# Patient Record
Sex: MALE | Race: WHITE | NOT HISPANIC OR LATINO | URBAN - METROPOLITAN AREA
[De-identification: names, ages, dates, MRNs, and addresses within clinical notes are randomized per-mention and may not be internally consistent; named-entity substitution may affect disease eponyms.]

---

## 2021-07-16 ENCOUNTER — INPATIENT (INPATIENT)
Facility: HOSPITAL | Age: 46
LOS: 3 days | Discharge: HOME | End: 2021-07-20
Attending: HOSPITALIST | Admitting: HOSPITALIST
Payer: COMMERCIAL

## 2021-07-16 VITALS
WEIGHT: 190.04 LBS | DIASTOLIC BLOOD PRESSURE: 91 MMHG | SYSTOLIC BLOOD PRESSURE: 136 MMHG | HEART RATE: 62 BPM | TEMPERATURE: 99 F | RESPIRATION RATE: 17 BRPM | OXYGEN SATURATION: 100 %

## 2021-07-16 LAB
ALBUMIN SERPL ELPH-MCNC: 4.1 G/DL — SIGNIFICANT CHANGE UP (ref 3.5–5.2)
ALP SERPL-CCNC: 91 U/L — SIGNIFICANT CHANGE UP (ref 30–115)
ALT FLD-CCNC: 475 U/L — HIGH (ref 0–41)
ANION GAP SERPL CALC-SCNC: 13 MMOL/L — SIGNIFICANT CHANGE UP (ref 7–14)
AST SERPL-CCNC: 98 U/L — HIGH (ref 0–41)
BASOPHILS # BLD AUTO: 0.04 K/UL — SIGNIFICANT CHANGE UP (ref 0–0.2)
BASOPHILS NFR BLD AUTO: 0.4 % — SIGNIFICANT CHANGE UP (ref 0–1)
BILIRUB SERPL-MCNC: 0.4 MG/DL — SIGNIFICANT CHANGE UP (ref 0.2–1.2)
BUN SERPL-MCNC: 84 MG/DL — CRITICAL HIGH (ref 10–20)
CALCIUM SERPL-MCNC: 9.5 MG/DL — SIGNIFICANT CHANGE UP (ref 8.5–10.1)
CHLORIDE SERPL-SCNC: 95 MMOL/L — LOW (ref 98–110)
CK SERPL-CCNC: 6923 U/L — HIGH (ref 0–225)
CO2 SERPL-SCNC: 27 MMOL/L — SIGNIFICANT CHANGE UP (ref 17–32)
CREAT SERPL-MCNC: 5.6 MG/DL — CRITICAL HIGH (ref 0.7–1.5)
EOSINOPHIL # BLD AUTO: 0.46 K/UL — SIGNIFICANT CHANGE UP (ref 0–0.7)
EOSINOPHIL NFR BLD AUTO: 4.5 % — SIGNIFICANT CHANGE UP (ref 0–8)
GLUCOSE SERPL-MCNC: 122 MG/DL — HIGH (ref 70–99)
HCT VFR BLD CALC: 44.7 % — SIGNIFICANT CHANGE UP (ref 42–52)
HGB BLD-MCNC: 14.8 G/DL — SIGNIFICANT CHANGE UP (ref 14–18)
IMM GRANULOCYTES NFR BLD AUTO: 1.1 % — HIGH (ref 0.1–0.3)
LYMPHOCYTES # BLD AUTO: 1.42 K/UL — SIGNIFICANT CHANGE UP (ref 1.2–3.4)
LYMPHOCYTES # BLD AUTO: 14 % — LOW (ref 20.5–51.1)
MCHC RBC-ENTMCNC: 32.3 PG — HIGH (ref 27–31)
MCHC RBC-ENTMCNC: 33.1 G/DL — SIGNIFICANT CHANGE UP (ref 32–37)
MCV RBC AUTO: 97.6 FL — HIGH (ref 80–94)
MONOCYTES # BLD AUTO: 1.06 K/UL — HIGH (ref 0.1–0.6)
MONOCYTES NFR BLD AUTO: 10.5 % — HIGH (ref 1.7–9.3)
NEUTROPHILS # BLD AUTO: 7.03 K/UL — HIGH (ref 1.4–6.5)
NEUTROPHILS NFR BLD AUTO: 69.5 % — SIGNIFICANT CHANGE UP (ref 42.2–75.2)
NRBC # BLD: 0 /100 WBCS — SIGNIFICANT CHANGE UP (ref 0–0)
PLATELET # BLD AUTO: 213 K/UL — SIGNIFICANT CHANGE UP (ref 130–400)
POTASSIUM SERPL-MCNC: 5.6 MMOL/L — HIGH (ref 3.5–5)
POTASSIUM SERPL-SCNC: 5.6 MMOL/L — HIGH (ref 3.5–5)
PROT SERPL-MCNC: 7 G/DL — SIGNIFICANT CHANGE UP (ref 6–8)
RBC # BLD: 4.58 M/UL — LOW (ref 4.7–6.1)
RBC # FLD: 12.1 % — SIGNIFICANT CHANGE UP (ref 11.5–14.5)
SODIUM SERPL-SCNC: 135 MMOL/L — SIGNIFICANT CHANGE UP (ref 135–146)
WBC # BLD: 10.12 K/UL — SIGNIFICANT CHANGE UP (ref 4.8–10.8)
WBC # FLD AUTO: 10.12 K/UL — SIGNIFICANT CHANGE UP (ref 4.8–10.8)

## 2021-07-16 PROCEDURE — 73090 X-RAY EXAM OF FOREARM: CPT | Mod: 26,RT

## 2021-07-16 PROCEDURE — 71046 X-RAY EXAM CHEST 2 VIEWS: CPT | Mod: 26

## 2021-07-16 PROCEDURE — 70450 CT HEAD/BRAIN W/O DYE: CPT | Mod: 26,MA

## 2021-07-16 PROCEDURE — 73080 X-RAY EXAM OF ELBOW: CPT | Mod: 26,RT

## 2021-07-16 PROCEDURE — 73110 X-RAY EXAM OF WRIST: CPT | Mod: 26,RT

## 2021-07-16 PROCEDURE — 99285 EMERGENCY DEPT VISIT HI MDM: CPT

## 2021-07-16 RX ORDER — VANCOMYCIN HCL 1 G
1000 VIAL (EA) INTRAVENOUS ONCE
Refills: 0 | Status: COMPLETED | OUTPATIENT
Start: 2021-07-16 | End: 2021-07-16

## 2021-07-16 RX ORDER — SODIUM CHLORIDE 9 MG/ML
2000 INJECTION, SOLUTION INTRAVENOUS ONCE
Refills: 0 | Status: COMPLETED | OUTPATIENT
Start: 2021-07-16 | End: 2021-07-16

## 2021-07-16 RX ADMIN — SODIUM CHLORIDE 2000 MILLILITER(S): 9 INJECTION, SOLUTION INTRAVENOUS at 23:31

## 2021-07-16 RX ADMIN — Medication 250 MILLIGRAM(S): at 23:31

## 2021-07-16 NOTE — ED PROVIDER NOTE - OBJECTIVE STATEMENT
Pt with no PMH presents 2 days after a fall while at . pt states there was a black out in the hotel and he tripped over a chair in his room. Pt thinks he blacked out from the fall and woke with pain to right arm, right ribs and right thigh. Over the next 2 days the pain worsened and he developed redness to the arm, thigh and now also to right upper arm. Denies fever chills. Admits to HA and NV today. Denies drug use. Was not intoxicated at time of fall. Length of LOC was unknown

## 2021-07-16 NOTE — ED ADULT TRIAGE NOTE - CHIEF COMPLAINT QUOTE
Pt was in [place] when he tripped and fell on his right side, No LOC. Pt c/o right arm, thigh bruising and right ankle pain. Redness noted on pt's RUQ and right lower thigh.

## 2021-07-16 NOTE — ED PROVIDER NOTE - PHYSICAL EXAMINATION
CONST: Well appearing in NAD  EYES: PERRL, EOMI, Sclera and conjunctiva clear.   ENT: Oropharynx normal appearing, no erythema or exudates. Uvula midline.  NECK: Non-tender, no meningeal signs  CARD: Normal S1 S2; Normal rate and rhythm  RESP: Equal BS B/L, No wheezes, rhonchi or rales. No distress  GI: Soft, non-tender, non-distended.  MS: RUE: there is tenderness and swelling from the elbow down to the hand with erythema to volar surface mid to proximal forearm. No streaking. No crepitous. NV intact distally. Pain on ROM of wrist and fingers. RLE with blistered red area to anterior thigh with localized swelling. No ecchymosis or streaking or crepitous of skin. FROM of joints intact. NV intact distally. LUE: lupe is small red macular papular rash to distal anterior bicep region with well defined edges. No streaking, drainage or swelling. FROM of joints and NV intact.  Ribs: there is red weeping blistered/abrated rash well defined border   to right anterior lower rib.  No midline spinal tenderness.    NEURO: A&Ox3, No focal deficits. Strength 5/5 with no sensory deficits. Steady gait

## 2021-07-16 NOTE — ED PROVIDER NOTE - CARE PLAN
Principal Discharge DX:	Cellulitis of forearm, right   Principal Discharge DX:	Acute renal failure  Secondary Diagnosis:	Cellulitis of forearm, right  Secondary Diagnosis:	Rhabdomyolysis

## 2021-07-16 NOTE — ED PROVIDER NOTE - CLINICAL SUMMARY MEDICAL DECISION MAKING FREE TEXT BOX
fall, cellulitis. labs, imaging, duplex neg, vanco given. admit to hospital. fall, cellulitis. labs, imaging, duplex neg, vanco given. found to be in acute renal failure. making urine. Renal US ordered. VBG ordered.  admit to hospital.

## 2021-07-16 NOTE — ED PROVIDER NOTE - ATTENDING CONTRIBUTION TO CARE
pt co RUE, RLE pain. rashes, chills. for 2 days. occurred after a fall at [place]. sts lights went out and he tripped on a chair. hit his head and +loc. no neck pain. since then his right forearm has been hurting and getting swollen. he denies fever but sts getting shaking chills.  he sts he has a red area that had all blisters on his right thigh which was leaking pus and he popped it.  denies IVDU or substance abuse. no antony.     pt in nad, ncat, perrl, eomi, neck sup, ctab, rrr, ab soft, nt, nd.    RUE +forearm edema down to fingers. nml bounding pulses. nml cap refill. dec ROM 2/2 edema and pain.  erythema to prox ventral forearm, indurated no fluctance. no streaking. no lad. no open skin. +warmth. no crepitus.  RLE has a 7x6cm patch on antior thigh of well circumscribed erthema  warmth, tender, broken bullae. no pus noted. no streaking.   Alert and oriented, face symmetric and speech fluent. Strength 5/5 x 4 ext and symmetric, nml gross motor movement, nml RRM/BONNIE/FTN, nml gait. No focal deficits noted.    will get labs, imaging, supportive care

## 2021-07-16 NOTE — ED PROVIDER NOTE - NS ED ROS FT
CONST: No fever, chills or bodyaches  EYES: No pain, redness, drainage or visual changes.  ENT: No ear pain or discharge, nasal discharge or congestion. No sore throat  CARD: No chest pain, palpitations  RESP: No SOB, cough, hemoptysis.   GI: No abdominal pain, (+)N/V  MS: (+) right arm and right thigh and right rib pain  SKIN: (+)red patches on extremities  NEURO: (+) headache, No dizziness, paresthesias   : no hematuria

## 2021-07-17 LAB
ALBUMIN SERPL ELPH-MCNC: 3.4 G/DL — LOW (ref 3.5–5.2)
ALP SERPL-CCNC: 68 U/L — SIGNIFICANT CHANGE UP (ref 30–115)
ALT FLD-CCNC: 331 U/L — HIGH (ref 0–41)
AMPHET UR-MCNC: NEGATIVE — SIGNIFICANT CHANGE UP
ANION GAP SERPL CALC-SCNC: 8 MMOL/L — SIGNIFICANT CHANGE UP (ref 7–14)
APPEARANCE UR: CLEAR — SIGNIFICANT CHANGE UP
AST SERPL-CCNC: 56 U/L — HIGH (ref 0–41)
BACTERIA # UR AUTO: NEGATIVE — SIGNIFICANT CHANGE UP
BARBITURATES UR SCN-MCNC: NEGATIVE — SIGNIFICANT CHANGE UP
BASE EXCESS BLDV CALC-SCNC: 4.3 MMOL/L — HIGH (ref -2–2)
BASOPHILS # BLD AUTO: 0.03 K/UL — SIGNIFICANT CHANGE UP (ref 0–0.2)
BASOPHILS NFR BLD AUTO: 0.4 % — SIGNIFICANT CHANGE UP (ref 0–1)
BENZODIAZ UR-MCNC: POSITIVE
BILIRUB DIRECT SERPL-MCNC: <0.2 MG/DL — SIGNIFICANT CHANGE UP (ref 0–0.2)
BILIRUB INDIRECT FLD-MCNC: >0.1 MG/DL — LOW (ref 0.2–1.2)
BILIRUB SERPL-MCNC: 0.3 MG/DL — SIGNIFICANT CHANGE UP (ref 0.2–1.2)
BILIRUB UR-MCNC: NEGATIVE — SIGNIFICANT CHANGE UP
BUN SERPL-MCNC: 73 MG/DL — CRITICAL HIGH (ref 10–20)
CA-I SERPL-SCNC: 1.2 MMOL/L — SIGNIFICANT CHANGE UP (ref 1.12–1.3)
CALCIUM SERPL-MCNC: 8.6 MG/DL — SIGNIFICANT CHANGE UP (ref 8.5–10.1)
CHLORIDE SERPL-SCNC: 101 MMOL/L — SIGNIFICANT CHANGE UP (ref 98–110)
CK SERPL-CCNC: 3634 U/L — HIGH (ref 0–225)
CO2 SERPL-SCNC: 27 MMOL/L — SIGNIFICANT CHANGE UP (ref 17–32)
COCAINE METAB.OTHER UR-MCNC: POSITIVE
COLOR SPEC: COLORLESS — SIGNIFICANT CHANGE UP
CREAT SERPL-MCNC: 4.5 MG/DL — CRITICAL HIGH (ref 0.7–1.5)
DIFF PNL FLD: ABNORMAL
EOSINOPHIL # BLD AUTO: 0.44 K/UL — SIGNIFICANT CHANGE UP (ref 0–0.7)
EOSINOPHIL NFR BLD AUTO: 5.6 % — SIGNIFICANT CHANGE UP (ref 0–8)
EPI CELLS # UR: 2 /HPF — SIGNIFICANT CHANGE UP (ref 0–5)
GAS PNL BLDV: 134 MMOL/L — LOW (ref 136–145)
GAS PNL BLDV: SIGNIFICANT CHANGE UP
GLUCOSE SERPL-MCNC: 77 MG/DL — SIGNIFICANT CHANGE UP (ref 70–99)
GLUCOSE UR QL: NEGATIVE — SIGNIFICANT CHANGE UP
HCO3 BLDV-SCNC: 31 MMOL/L — HIGH (ref 22–29)
HCT VFR BLD CALC: 36.8 % — LOW (ref 42–52)
HCT VFR BLDA CALC: 40.5 % — SIGNIFICANT CHANGE UP (ref 34–44)
HGB BLD CALC-MCNC: 13.2 G/DL — LOW (ref 14–18)
HGB BLD-MCNC: 11.8 G/DL — LOW (ref 14–18)
IMM GRANULOCYTES NFR BLD AUTO: 1 % — HIGH (ref 0.1–0.3)
KETONES UR-MCNC: NEGATIVE — SIGNIFICANT CHANGE UP
LEUKOCYTE ESTERASE UR-ACNC: NEGATIVE — SIGNIFICANT CHANGE UP
LYMPHOCYTES # BLD AUTO: 1.51 K/UL — SIGNIFICANT CHANGE UP (ref 1.2–3.4)
LYMPHOCYTES # BLD AUTO: 19.1 % — LOW (ref 20.5–51.1)
MAGNESIUM SERPL-MCNC: 2.1 MG/DL — SIGNIFICANT CHANGE UP (ref 1.8–2.4)
MCHC RBC-ENTMCNC: 31.7 PG — HIGH (ref 27–31)
MCHC RBC-ENTMCNC: 32.1 G/DL — SIGNIFICANT CHANGE UP (ref 32–37)
MCV RBC AUTO: 98.9 FL — HIGH (ref 80–94)
METHADONE UR-MCNC: NEGATIVE — SIGNIFICANT CHANGE UP
MONOCYTES # BLD AUTO: 1.03 K/UL — HIGH (ref 0.1–0.6)
MONOCYTES NFR BLD AUTO: 13.1 % — HIGH (ref 1.7–9.3)
NEUTROPHILS # BLD AUTO: 4.8 K/UL — SIGNIFICANT CHANGE UP (ref 1.4–6.5)
NEUTROPHILS NFR BLD AUTO: 60.8 % — SIGNIFICANT CHANGE UP (ref 42.2–75.2)
NITRITE UR-MCNC: NEGATIVE — SIGNIFICANT CHANGE UP
NRBC # BLD: 0 /100 WBCS — SIGNIFICANT CHANGE UP (ref 0–0)
OPIATES UR-MCNC: POSITIVE
PCO2 BLDV: 57 MMHG — HIGH (ref 41–51)
PCP SPEC-MCNC: SIGNIFICANT CHANGE UP
PH BLDV: 7.35 — SIGNIFICANT CHANGE UP (ref 7.26–7.43)
PH UR: 6.5 — SIGNIFICANT CHANGE UP (ref 5–8)
PLATELET # BLD AUTO: 161 K/UL — SIGNIFICANT CHANGE UP (ref 130–400)
PO2 BLDV: 37 MMHG — SIGNIFICANT CHANGE UP (ref 20–40)
POTASSIUM BLDV-SCNC: 4.9 MMOL/L — SIGNIFICANT CHANGE UP (ref 3.3–5.6)
POTASSIUM SERPL-MCNC: 5 MMOL/L — SIGNIFICANT CHANGE UP (ref 3.5–5)
POTASSIUM SERPL-SCNC: 5 MMOL/L — SIGNIFICANT CHANGE UP (ref 3.5–5)
PROPOXYPHENE QUALITATIVE URINE RESULT: NEGATIVE — SIGNIFICANT CHANGE UP
PROT SERPL-MCNC: 5.5 G/DL — LOW (ref 6–8)
PROT UR-MCNC: SIGNIFICANT CHANGE UP
RBC # BLD: 3.72 M/UL — LOW (ref 4.7–6.1)
RBC # FLD: 12 % — SIGNIFICANT CHANGE UP (ref 11.5–14.5)
RBC CASTS # UR COMP ASSIST: 5 /HPF — HIGH (ref 0–4)
SAO2 % BLDV: 71 % — SIGNIFICANT CHANGE UP
SARS-COV-2 RNA SPEC QL NAA+PROBE: SIGNIFICANT CHANGE UP
SODIUM SERPL-SCNC: 136 MMOL/L — SIGNIFICANT CHANGE UP (ref 135–146)
SP GR SPEC: 1.01 — LOW (ref 1.01–1.03)
UROBILINOGEN FLD QL: SIGNIFICANT CHANGE UP
WBC # BLD: 7.89 K/UL — SIGNIFICANT CHANGE UP (ref 4.8–10.8)
WBC # FLD AUTO: 7.89 K/UL — SIGNIFICANT CHANGE UP (ref 4.8–10.8)
WBC UR QL: 0 /HPF — SIGNIFICANT CHANGE UP (ref 0–5)

## 2021-07-17 PROCEDURE — 99223 1ST HOSP IP/OBS HIGH 75: CPT

## 2021-07-17 PROCEDURE — 93010 ELECTROCARDIOGRAM REPORT: CPT

## 2021-07-17 PROCEDURE — 76770 US EXAM ABDO BACK WALL COMP: CPT | Mod: 26

## 2021-07-17 PROCEDURE — 99222 1ST HOSP IP/OBS MODERATE 55: CPT

## 2021-07-17 RX ORDER — MORPHINE SULFATE 50 MG/1
4 CAPSULE, EXTENDED RELEASE ORAL ONCE
Refills: 0 | Status: DISCONTINUED | OUTPATIENT
Start: 2021-07-17 | End: 2021-07-17

## 2021-07-17 RX ORDER — MORPHINE SULFATE 50 MG/1
2 CAPSULE, EXTENDED RELEASE ORAL ONCE
Refills: 0 | Status: DISCONTINUED | OUTPATIENT
Start: 2021-07-17 | End: 2021-07-17

## 2021-07-17 RX ORDER — ACETAMINOPHEN 500 MG
650 TABLET ORAL EVERY 6 HOURS
Refills: 0 | Status: DISCONTINUED | OUTPATIENT
Start: 2021-07-17 | End: 2021-07-17

## 2021-07-17 RX ORDER — ONDANSETRON 8 MG/1
4 TABLET, FILM COATED ORAL EVERY 8 HOURS
Refills: 0 | Status: DISCONTINUED | OUTPATIENT
Start: 2021-07-17 | End: 2021-07-20

## 2021-07-17 RX ORDER — LANOLIN ALCOHOL/MO/W.PET/CERES
3 CREAM (GRAM) TOPICAL AT BEDTIME
Refills: 0 | Status: DISCONTINUED | OUTPATIENT
Start: 2021-07-17 | End: 2021-07-20

## 2021-07-17 RX ORDER — CEFAZOLIN SODIUM 1 G
1000 VIAL (EA) INJECTION EVERY 8 HOURS
Refills: 0 | Status: DISCONTINUED | OUTPATIENT
Start: 2021-07-17 | End: 2021-07-17

## 2021-07-17 RX ORDER — SODIUM CHLORIDE 9 MG/ML
1000 INJECTION INTRAMUSCULAR; INTRAVENOUS; SUBCUTANEOUS
Refills: 0 | Status: DISCONTINUED | OUTPATIENT
Start: 2021-07-17 | End: 2021-07-18

## 2021-07-17 RX ORDER — CEFAZOLIN SODIUM 1 G
1000 VIAL (EA) INJECTION EVERY 12 HOURS
Refills: 0 | Status: DISCONTINUED | OUTPATIENT
Start: 2021-07-17 | End: 2021-07-17

## 2021-07-17 RX ORDER — HEPARIN SODIUM 5000 [USP'U]/ML
5000 INJECTION INTRAVENOUS; SUBCUTANEOUS EVERY 12 HOURS
Refills: 0 | Status: DISCONTINUED | OUTPATIENT
Start: 2021-07-17 | End: 2021-07-20

## 2021-07-17 RX ADMIN — HEPARIN SODIUM 5000 UNIT(S): 5000 INJECTION INTRAVENOUS; SUBCUTANEOUS at 05:00

## 2021-07-17 RX ADMIN — MORPHINE SULFATE 2 MILLIGRAM(S): 50 CAPSULE, EXTENDED RELEASE ORAL at 12:09

## 2021-07-17 RX ADMIN — MORPHINE SULFATE 2 MILLIGRAM(S): 50 CAPSULE, EXTENDED RELEASE ORAL at 17:30

## 2021-07-17 RX ADMIN — Medication 100 MILLIGRAM(S): at 13:54

## 2021-07-17 RX ADMIN — HEPARIN SODIUM 5000 UNIT(S): 5000 INJECTION INTRAVENOUS; SUBCUTANEOUS at 17:30

## 2021-07-17 RX ADMIN — SODIUM CHLORIDE 200 MILLILITER(S): 9 INJECTION INTRAMUSCULAR; INTRAVENOUS; SUBCUTANEOUS at 04:50

## 2021-07-17 RX ADMIN — MORPHINE SULFATE 2 MILLIGRAM(S): 50 CAPSULE, EXTENDED RELEASE ORAL at 10:42

## 2021-07-17 RX ADMIN — MORPHINE SULFATE 4 MILLIGRAM(S): 50 CAPSULE, EXTENDED RELEASE ORAL at 04:49

## 2021-07-17 RX ADMIN — Medication 100 MILLIGRAM(S): at 05:00

## 2021-07-17 RX ADMIN — Medication 100 MILLIGRAM(S): at 22:03

## 2021-07-17 RX ADMIN — Medication 100 MILLIGRAM(S): at 09:40

## 2021-07-17 NOTE — H&P ADULT - ATTENDING COMMENTS
Rhabdomyolysis s/p mechanical fall. IVFs (1/2 NS). Monitor urinary out-put (Maintain 1-1.5 cc/kg/hr). If BELEN and oliguria develop, alkalinize the urine with bicarb infusion or addition of bicarb to the IVFs. Trend CK/LFTs levels. Serial BMPs.    Seizure w/ post-ictal state, now resolved. Neurology is following. Keppra load. Ativan PRN. AED levels. EEG. MRI. Restart home meds. Seizure precautions.     Cellulitis of LE; No sepsis on admission. Send ESR/CRP. A1c and lipids. FU blood cultures. IV ABXs (Unasyn). FU official XR results. ID consult. Obtain venous/arterial doppler. Vascular consult if positive.       Incomplete Note 44 YO M with no known PMH who presents to the hospital with a c/o RUE pain/swelling s/p trip and fall in bathroom x 2 days ago. + HT, + LOC (~ 1 hour). Found on the ground w/ vomitus around him by his brother-in-law. Denies any incontinence or tongue biting. Denies any drug or EtOH use. ROS negative except as above.     Of note, pt has been taking Naproxen that was given to him by a friend ever since his fall.     In the ED, XRs/Doppler of RUE are negative (pending official read). Chest X-Ray with no acute process (pending official read). CTH is negative for acute process. Pt with elevated Ck and BELEN, started on IVFs (LR) and IV ABXs (Vanco) in the ED.     FMHx: Reviewed, not relevant    Physical exam shows pt in NAD. VSS, afebrile, not hypoxic on RA. A&Ox3. Head is atraumatic. Non-focal neuro exam. Muscle strength intact. Numbness of fingers of the right hand. CTA B/L with no W/C/R. RRR, no M/G/R. ABD is soft and non-tender, normoactive BSs. Right forearm is tense, paresthesia of fingertips, abrasion noted, pulses palpable, motor strength intact. Right thigh with large abrasion on lateral aspect with skin tear. Left forearm with abrasion. Labs and radiology as above.     Rhabdomyolysis s/p mechanical fall. IVFs (1/2 NS). Monitor urinary out-put (Maintain 1-1.5 cc/kg/hr). If BELEN and oliguria develop, alkalinize the urine with bicarb infusion or addition of bicarb to the IVFs. Trend CK/LFTs levels. Serial BMPs.    Transaminitis, from above. IVFs. Repeat LFTs in the AM.     BELEN, likely pre-renal + NSAID-induced nephropathy. Send UA, urine lytes, urine pr:cr ratio, and trend BMP. IVFs. Monitor urinary out-put. Hold nephrotoxic agents.     Fall with head trauma and possible seizure. Neuro eval. Ativan PRN. EEG. Restart home meds. Seizure precautions.     Cellulitis of right thigh; No sepsis on admission. A1c and lipids. FU blood cultures. IV ABXs (Cefezolin). FU official XR results. FU official doppler results.     Restart home meds, except as stated above. DVT PPX. Inform PCP of pt's admission to hospital. My note supersedes the residents note.

## 2021-07-17 NOTE — ED ADULT NURSE NOTE - OBJECTIVE STATEMENT
Pt with no PMH presents 2 days after a fall while at . pt states there was a black out in the hotel and he tripped over a chair in his room. Pt thinks he blacked out from the fall and woke with pain to right arm, right ribs and right thigh. Over the next 2 days the pain worsened and he developed redness to the arm, thigh and now also to right upper arm. Denies fever chills. Admits to HA and NV today

## 2021-07-17 NOTE — CONSULT NOTE ADULT - ASSESSMENT
Impression:  Pt with no PMH presents 2 days after a fall while at [place]. Patient states there was a black out in the hotel and he tripped over a chair in his room. Pt thinks he blacked out from the fall and woke with pain to right arm, right ribs and right thigh. Over the next 2 days the pain worsened and he developed redness to the arm, thigh and now also to right upper arm. He denies fever but states he's been getting shaking chills. Labs remarkable for Cr 5.6 CK 6.9K ALT//98. CT head negative for acute bleed. Ptn admitted to medicine service for rhabdomyolysis secondary to presumed diffuse tissue injury s/p traumatic fall. On exam limited range of motion RUE RLE and hypersensitivity.     Suggestion:  Rhabdomyolisis management by medicine.   RUE swelling management by medicine.   Routine EEG  Keep magnesium >2  Seizure precautions  Trend CK and LFTs    Will Napier NP   Impression:  Pt with no PMH presents 2 days after a fall while at [place]. Patient states there was a black out in the hotel and he tripped over a chair in his room. Pt thinks he blacked out from the fall and woke with pain to right arm, right ribs and right thigh. Over the next 2 days the pain worsened and he developed redness to the arm, thigh and now also to right upper arm. He denies fever but states he's been getting shaking chills. Labs remarkable for Cr 5.6 CK 6.9K ALT//98. CT head negative for acute bleed. Ptn admitted to medicine service for rhabdomyolysis secondary to presumed diffuse tissue injury s/p traumatic fall. On exam limited range of motion RUE RLE and hypersensitivity.     Suggestion:  Rhabdomyolisis management by medicine.   RUE swelling management by medicine/surgery  Routine EEG > VEEG  Keep magnesium >2  Seizure precautions  Trend CK and LFTs  no AEDs for now    Will Napier NP

## 2021-07-17 NOTE — CONSULT NOTE ADULT - ATTENDING COMMENTS
I have personally seen and examined this patient.  I have fully participated in the care of this patient.  I have reviewed all pertinent clinical information, including history, physical exam, plan and note.   I have reviewed all pertinent clinical information and reviewed all relevant imaging and diagnostic studies personally.  Pt w/ unwitnessed LOC w/ prolonged LOC r/o seizure.  Recommend f/u REEG and proceed with VEEG x 24 hrs.  No AEDs for now.  Recommendations as above.  Agree with above assessment except as noted.

## 2021-07-17 NOTE — CONSULT NOTE ADULT - SUBJECTIVE AND OBJECTIVE BOX
GENERAL SURGERY CONSULT NOTE    Patient: JOCY KENNEDY , 45y ([birth year])Male   MRN: [id number]  Location: [facility] T4-3B 022 B  Visit: 21 Inpatient  Date: 21 @ 06:18    HPI:  Pt with no PMH presents 2 days after a fall while at [place]. Patient states there was a black out in the hotel and he tripped over a chair in his room. Pt thinks he blacked out from the fall and woke with pain to right arm, right ribs and right thigh. Over the next 2 days the pain worsened and he developed redness to the arm, thigh and now also to right upper arm. He denies fever but states he's been getting shaking chills. He states he has a red area that had all blisters on his right thigh which was leaking pus and he popped it.  Denies IVDU or substance abuse.chills. Admits to HA and NV today. Denies drug use. Was not intoxicated at time of fall. Length of LOC was unknown. Denies diarrhea/constipation / recent sick contacts / weakness    In the ED  BP  136/91  HR 62  RR 17  T 98.7  SpO2 100% on room air. Labs remarkable for Cr 5.6 CK 6.9K ALT//98. CT head negative for acute bleed. Ptn admitted to medicine service for rhabdomyolysis secondary to presumed diffuse tissue injury s/p traumatic fall under questionable circumstances   (2021 01:27)    VITALS:  T(F): 97.9 (21 @ 05:10), Max: 98.7 (21 @ 20:12)  HR: 62 (21 @ 05:10) (57 - 62)  BP: 131/77 (21 @ 05:10) (131/77 - 142/85)  RR: 16 (21 @ 05:10) (16 - 18)  SpO2: 99% (21 @ 05:10) (99% - 100%)    PHYSICAL EXAM:  General: NAD, AAOx3, calm and cooperative  HEENT:  EOMI, Trachea ML, Neck supple  Cardiac: RRR S1, S2, no Murmurs, rubs or gallops  Respiratory: CTAB, normal respiratory effort, breath sounds equal BL, no wheeze, rhonchi or crackles  Abdomen: Soft, non-distended, non-tender  Musculoskeletal: Reduced strength and range of motion in right arm with obvious arm swelling.   Neuro: Sensation grossly intact and equal throughout, no focal deficits  Vascular: Ultrasound was used and found good radial/ulnar/palmar blood flow  Skin: Abrasion on right forearm, right thigh, and left extremity    MEDICATIONS  (STANDING):  ceFAZolin   IVPB 1000 milliGRAM(s) IV Intermittent every 12 hours  heparin   Injectable 5000 Unit(s) SubCutaneous every 12 hours  sodium chloride 0.9%. 1000 milliLiter(s) (200 mL/Hr) IV Continuous <Continuous>    MEDICATIONS  (PRN):  aluminum hydroxide/magnesium hydroxide/simethicone Suspension 30 milliLiter(s) Oral every 4 hours PRN Dyspepsia  melatonin 3 milliGRAM(s) Oral at bedtime PRN Insomnia  ondansetron Injectable 4 milliGRAM(s) IV Push every 8 hours PRN Nausea and/or Vomiting      LAB/STUDIES:                        14.8   10.12 )-----------( 213      ( 2021 22:28 )             44.7     07-16    135  |  95<L>  |  84<HH>  ----------------------------<  122<H>  5.6<H>   |  27  |  5.6<HH>    Ca    9.5      2021 22:28    TPro  7.0  /  Alb  4.1  /  TBili  0.4  /  DBili  x   /  AST  98<H>  /  ALT  475<H>  /  AlkPhos  91  -16      LIVER FUNCTIONS - ( 2021 22:28 )  Alb: 4.1 g/dL / Pro: 7.0 g/dL / ALK PHOS: 91 U/L / ALT: 475 U/L / AST: 98 U/L / GGT: x           Urinalysis Basic - ( 2021 23:56 )    Color: Colorless / Appearance: Clear / S.007 / pH: x  Gluc: x / Ketone: Negative  / Bili: Negative / Urobili: <2 mg/dL   Blood: x / Protein: Trace / Nitrite: Negative   Leuk Esterase: Negative / RBC: 5 /HPF / WBC 0 /HPF   Sq Epi: x / Non Sq Epi: 2 /HPF / Bacteria: Negative      CARDIAC MARKERS ( 2021 22:28 )  x     / x     / 6923 U/L / x     / x        IMAGING:  < from: CT Head No Cont (21 @ 22:15) >  No evidence for acute intracranial hemorrhage or acute calvarial fracture.    < end of copied text >      ACCESS DEVICES:  [x ] Peripheral IV  [ ] Central Venous Line	[ ] R	[ ] L	[ ] IJ	[ ] Fem	[ ] SC	Placed:   [ ] Arterial Line		[ ] R	[ ] L	[ ] Fem	[ ] Rad	[ ] Ax	Placed:   [ ] PICC:					[ ] Mediport  [ ] Urinary Catheter, Date Placed:  GENERAL SURGERY CONSULT NOTE    Patient: JOCY KENNEDY , 45y ([birth year])Male   MRN: [id number]  Location: [facility] T4-3B 022 B  Visit: 21 Inpatient  Date: 21 @ 06:18    HPI:  Pt with no PMH presents 2 days after a fall while at [place]. Patient states there was a black out in the hotel and he tripped over a chair in his room. Pt thinks he blacked out from the fall and woke with pain to right arm, right ribs and right thigh. Over the next 2 days the pain worsened and he developed redness to the arm, thigh and now also to right upper arm. He denies fever but states he's been getting shaking chills. He states he has a red area that had all blisters on his right thigh which was leaking pus and he popped it.  Denies IVDU or substance abuse.chills. Admits to HA and NV today. Denies drug use. Was not intoxicated at time of fall. Length of LOC was unknown. Denies diarrhea/constipation / recent sick contacts / weakness    In the ED  BP  136/91  HR 62  RR 17  T 98.7  SpO2 100% on room air. Labs remarkable for Cr 5.6 CK 6.9K ALT//98. CT head negative for acute bleed. Ptn admitted to medicine service for rhabdomyolysis secondary to presumed diffuse tissue injury s/p traumatic fall under questionable circumstances   (2021 01:27)    VITALS:  T(F): 97.9 (21 @ 05:10), Max: 98.7 (21 @ 20:12)  HR: 62 (21 @ 05:10) (57 - 62)  BP: 131/77 (21 @ 05:10) (131/77 - 142/85)  RR: 16 (21 @ 05:10) (16 - 18)  SpO2: 99% (21 @ 05:10) (99% - 100%)    PHYSICAL EXAM:  General: NAD, AAOx3, calm and cooperative  HEENT:  EOMI, Trachea ML, Neck supple  Cardiac: RRR S1, S2, no Murmurs, rubs or gallops  Respiratory: CTAB, normal respiratory effort, breath sounds equal BL, no wheeze, rhonchi or crackles  Abdomen: Soft, non-distended, non-tender  Musculoskeletal: Reduced strength and range of motion in right arm with obvious arm swelling.   Neuro: Sensation grossly intact and equal throughout, no focal deficits  Vascular: Ultrasound was used and found good radial/ulnar/palmar blood flow  Skin: Abrasion on right forearm, right thigh, and left upper extremity    MEDICATIONS  (STANDING):  ceFAZolin   IVPB 1000 milliGRAM(s) IV Intermittent every 12 hours  heparin   Injectable 5000 Unit(s) SubCutaneous every 12 hours  sodium chloride 0.9%. 1000 milliLiter(s) (200 mL/Hr) IV Continuous <Continuous>    MEDICATIONS  (PRN):  aluminum hydroxide/magnesium hydroxide/simethicone Suspension 30 milliLiter(s) Oral every 4 hours PRN Dyspepsia  melatonin 3 milliGRAM(s) Oral at bedtime PRN Insomnia  ondansetron Injectable 4 milliGRAM(s) IV Push every 8 hours PRN Nausea and/or Vomiting      LAB/STUDIES:                        14.8   10.12 )-----------( 213      ( 2021 22:28 )             44.7     07-16    135  |  95<L>  |  84<HH>  ----------------------------<  122<H>  5.6<H>   |  27  |  5.6<HH>    Ca    9.5      2021 22:28    TPro  7.0  /  Alb  4.1  /  TBili  0.4  /  DBili  x   /  AST  98<H>  /  ALT  475<H>  /  AlkPhos  91  -16      LIVER FUNCTIONS - ( 2021 22:28 )  Alb: 4.1 g/dL / Pro: 7.0 g/dL / ALK PHOS: 91 U/L / ALT: 475 U/L / AST: 98 U/L / GGT: x           Urinalysis Basic - ( 2021 23:56 )    Color: Colorless / Appearance: Clear / S.007 / pH: x  Gluc: x / Ketone: Negative  / Bili: Negative / Urobili: <2 mg/dL   Blood: x / Protein: Trace / Nitrite: Negative   Leuk Esterase: Negative / RBC: 5 /HPF / WBC 0 /HPF   Sq Epi: x / Non Sq Epi: 2 /HPF / Bacteria: Negative      CARDIAC MARKERS ( 2021 22:28 )  x     / x     / 6923 U/L / x     / x        IMAGING:  < from: CT Head No Cont (21 @ 22:15) >  No evidence for acute intracranial hemorrhage or acute calvarial fracture.    < end of copied text >      ACCESS DEVICES:  [x ] Peripheral IV  [ ] Central Venous Line	[ ] R	[ ] L	[ ] IJ	[ ] Fem	[ ] SC	Placed:   [ ] Arterial Line		[ ] R	[ ] L	[ ] Fem	[ ] Rad	[ ] Ax	Placed:   [ ] PICC:					[ ] Mediport  [ ] Urinary Catheter, Date Placed:

## 2021-07-17 NOTE — CONSULT NOTE ADULT - SUBJECTIVE AND OBJECTIVE BOX
Neurology Consult    Patient is a 45y old  Male who presents with a chief complaint of LOC    HPI:  Pt with no PMH presents 2 days after a fall while at [place]. Patient states there was a black out in the hotel and he tripped over a chair in his room. Pt thinks he blacked out from the fall and woke with pain to right arm, right ribs and right thigh. Over the next 2 days the pain worsened and he developed redness to the arm, thigh and now also to right upper arm. He denies fever but states he's been getting shaking chills. He states he has a red area that had all blisters on his right thigh which was leaking pus and he popped it.  Denies IVDU or substance abuse.chills. Admits to HA and NV today. Denies drug use. Was not intoxicated at time of fall. Length of LOC was unknown. Denies diarrhea/constipation / recent sick contacts / weakness    In the ED  BP  136/91  HR 62  RR 17  T 98.7  SpO2 100% on room air. Labs remarkable for Cr 5.6 CK 6.9K ALT//98. CT head negative for acute bleed. Ptn admitted to medicine service for rhabdomyolysis secondary to presumed diffuse tissue injury s/p traumatic fall under questionable circumstances   (2021 01:27)      PAST MEDICAL & SURGICAL HISTORY:      FAMILY HISTORY:      Social History: (-) x 3    Allergies    No Known Allergies    Intolerances        MEDICATIONS  (STANDING):  ceFAZolin   IVPB 1000 milliGRAM(s) IV Intermittent every 12 hours  heparin   Injectable 5000 Unit(s) SubCutaneous every 12 hours  sodium chloride 0.9%. 1000 milliLiter(s) (200 mL/Hr) IV Continuous <Continuous>    MEDICATIONS  (PRN):  aluminum hydroxide/magnesium hydroxide/simethicone Suspension 30 milliLiter(s) Oral every 4 hours PRN Dyspepsia  melatonin 3 milliGRAM(s) Oral at bedtime PRN Insomnia  ondansetron Injectable 4 milliGRAM(s) IV Push every 8 hours PRN Nausea and/or Vomiting      Vital Signs Last 24 Hrs  T(C): 36.6 (2021 05:10), Max: 37.1 (2021 20:12)  T(F): 97.9 (2021 05:10), Max: 98.7 (2021 20:12)  HR: 62 (2021 05:10) (57 - 62)  BP: 131/77 (2021 05:10) (131/77 - 142/85)  BP(mean): --  RR: 16 (2021 05:10) (16 - 18)  SpO2: 99% (2021 05:10) (99% - 100%)    Examination:  General:  Appearance is consistent with chronologic age.  No abnormal facies.  Gross skin survey within normal limits.    Cognitive/Language:  The patient is oriented to person, place, time and date.  Recent and remote memory intact.  Fund of knowledge is intact and normal.  Language with normal repetition, comprehension and naming.  Nondysarthric.    Eyes: intact VA, VFF.  EOMI w/o nystagmus, skew or reported double vision.  PERRL.  No ptosis/weakness of eyelid closure.    Face:  Facial sensation normal V1 - 3, no facial asymmetry.    Motor examination:   Normal tone, bulk and range of motion.  No tenderness, twitching, tremors or involuntary movements.  Formal Muscle Strength Testing: (MRC grade R/L) 5/5 UE; 5/5 LE.  No observable drift however limited motion to RUE RLE due to injury.   Sensory examination:   Intact to light touch in all extremities. However right upper and lower extremity hypersensitivity.   Cerebellum:   FTN/HKS intact with normal BONNIE in all limbs.  No dysmetria or dysdiadokinesia.  Gait deferred    Labs:   CBC Full  -  ( 2021 22:28 )  WBC Count : 10.12 K/uL  RBC Count : 4.58 M/uL  Hemoglobin : 14.8 g/dL  Hematocrit : 44.7 %  Platelet Count - Automated : 213 K/uL  Mean Cell Volume : 97.6 fL  Mean Cell Hemoglobin : 32.3 pg  Mean Cell Hemoglobin Concentration : 33.1 g/dL  Auto Neutrophil # : 7.03 K/uL  Auto Lymphocyte # : 1.42 K/uL  Auto Monocyte # : 1.06 K/uL  Auto Eosinophil # : 0.46 K/uL  Auto Basophil # : 0.04 K/uL  Auto Neutrophil % : 69.5 %  Auto Lymphocyte % : 14.0 %  Auto Monocyte % : 10.5 %  Auto Eosinophil % : 4.5 %  Auto Basophil % : 0.4 %    -16    135  |  95<L>  |  84<HH>  ----------------------------<  122<H>  5.6<H>   |  27  |  5.6<HH>    Ca    9.5      2021 22:28    TPro  7.0  /  Alb  4.1  /  TBili  0.4  /  DBili  x   /  AST  98<H>  /  ALT  475<H>  /  AlkPhos  91  07-16    LIVER FUNCTIONS - ( 2021 22:28 )  Alb: 4.1 g/dL / Pro: 7.0 g/dL / ALK PHOS: 91 U/L / ALT: 475 U/L / AST: 98 U/L / GGT: x             Urinalysis Basic - ( 2021 23:56 )    Color: Colorless / Appearance: Clear / S.007 / pH: x  Gluc: x / Ketone: Negative  / Bili: Negative / Urobili: <2 mg/dL   Blood: x / Protein: Trace / Nitrite: Negative   Leuk Esterase: Negative / RBC: 5 /HPF / WBC 0 /HPF   Sq Epi: x / Non Sq Epi: 2 /HPF / Bacteria: Negative          Neuroimaging:  NCHCT: CT Head No Cont:   EXAM:  CT BRAIN          IMPRESSION:    No evidence for acute intracranial hemorrhage or acute calvarial fracture.    --- End of Report ---            MACRINA CELESTIN MD; Resident Radiologist  This document has been electronically signed.  JOAQUIN LAMAR MD; Attending Radiologist  This document has been electronically signed. 2021 11:14PM (21 @ 22:15)      21 @ 06:18

## 2021-07-17 NOTE — CONSULT NOTE ADULT - ASSESSMENT
ASSESSMENT:  Pt with no PMH presents 2 days after a fall with positive LOC now with residual hand pain.     PLAN:  - f/u arm xrays  - pain management  - strict ins and outs  - iv fluids  - dvt/gi prophylaxis  - elevation of arm with posey block    Lines/Tubes: PIV    Above plan discussed with Dr. Berman and Attending Surgeon Dr. Roa and Primary team  07-17-21 @ 06:18    CONSULT SPECTRA: [id number]

## 2021-07-17 NOTE — ED ADULT NURSE NOTE - CHIEF COMPLAINT QUOTE
Pt was in [place] when he tripped and fell on his right side, No LOC. Pt c/o right arm, thigh bruising and right ankle pain. Redness noted on pt's RUQ and right lower thigh.

## 2021-07-17 NOTE — H&P ADULT - ASSESSMENT
Pt with no PMH presents 2 days after stating he had a fall with LOC while at [place] with pain, swelling and ecchymosis to right arm, right ribs and right thigh, worsening now with assocated chills, HA and NV today. admitted to medicine service for rhabdomyolysis secondary to presumed diffuse tissue injury s/p traumatic fall under questionable circumstances    #Rhabdomyolysis secondary to diffuse tissue injury   - After sustaining traumatic fall? History provided does not corroborate with type/extent of tissue injury  - Cr 5.6 Creatine Kinase 6.9K,   - denies any use of drugs of abuse that can cause tissue ischaemia (alcohol/amphetamines/cocaine etc)  - Start IVF LR 200cc/hr (avoid NS for now d/t hyperkalemia)  - f/u urine drug screen  - Social work consult for high suspicion violent attack--> ptn may be at risk once discharged    #Hyperkalemia on admission  - likely secondary to difuse tissue injury and intracellular release  - f/u am EKG  - f/u BMP tonight    #Elevated Liver Enzymes secondary to hepatic injury  - ALT//98  - unkown cause? Denies drug ingestion,   - possibly secondary to blunt force abdominal trauma Ecchymosis and bruising over RUQ)  - f/u RUQ ultrasound, trend enzymes daily  - Avoid hepatotoxic medications  - f/u urine drug screen, Hepatitis panel    #Misc  - DVT Prophylaxis: Heparin SubQ  - GI Prophylaxis: Pantoprazole  - Diet: Regular  - Activity: IAT  - IV Fluids: LR  - Code Status: Full code

## 2021-07-17 NOTE — H&P ADULT - NSHPLABSRESULTS_GEN_ALL_CORE
LABS:                        14.8   10.12 )-----------( 213      ( 16 Jul 2021 22:28 )             44.7     07-16    135  |  95<L>  |  84<HH>  ----------------------------<  122<H>  5.6<H>   |  27  |  5.6<HH>    Ca    9.5      16 Jul 2021 22:28    TPro  7.0  /  Alb  4.1  /  TBili  0.4  /  DBili  x   /  AST  98<H>  /  ALT  475<H>  /  AlkPhos  91  07-16          Creatine Kinase, Serum: 6923 U/L *H* (07-16-21 @ 22:28)      CARDIAC MARKERS ( 16 Jul 2021 22:28 )  x     / x     / 6923 U/L / x     / x

## 2021-07-17 NOTE — CHART NOTE - NSCHARTNOTEFT_GEN_A_CORE
Patient seen and examined. Patient reports right arm pain, swelling.   Plan: c/w IV abx, IVF, trend CPK and renal function, monitor UOP. Check CT right arm. Neuro consult noted, plan for VEEG.

## 2021-07-17 NOTE — H&P ADULT - HISTORY OF PRESENT ILLNESS
Pt with no PMH presents 2 days after a fall while at [place]. Patient states there was a black out in the hotel and he tripped over a chair in his room. Pt thinks he blacked out from the fall and woke with pain to right arm, right ribs and right thigh. Over the next 2 days the pain worsened and he developed redness to the arm, thigh and now also to right upper arm. He denies fever but states he's been getting shaking chills. He states he has a red area that had all blisters on his right thigh which was leaking pus and he popped it.  Denies IVDU or substance abuse.chills. Admits to HA and NV today. Denies drug use. Was not intoxicated at time of fall. Length of LOC was unknown. Denies diarrhea/constipation / recent sick contacts / weakness    In the ED  BP  136/91  HR 62  RR 17  T 98.7  SpO2 100% on room air. Labs remarkable for Cr 5.6 CK 6.9K ALT//98. CT head negative for acute bleed. Ptn admitted to medicine service for rhabdomyolysis secondary to presumed diffuse tissue injury s/p traumatic fall under questionable circumstances

## 2021-07-18 LAB
ALBUMIN SERPL ELPH-MCNC: 3.3 G/DL — LOW (ref 3.5–5.2)
ALP SERPL-CCNC: 67 U/L — SIGNIFICANT CHANGE UP (ref 30–115)
ALT FLD-CCNC: 233 U/L — HIGH (ref 0–41)
ANION GAP SERPL CALC-SCNC: 10 MMOL/L — SIGNIFICANT CHANGE UP (ref 7–14)
AST SERPL-CCNC: 36 U/L — SIGNIFICANT CHANGE UP (ref 0–41)
BASOPHILS # BLD AUTO: 0.03 K/UL — SIGNIFICANT CHANGE UP (ref 0–0.2)
BASOPHILS NFR BLD AUTO: 0.4 % — SIGNIFICANT CHANGE UP (ref 0–1)
BILIRUB DIRECT SERPL-MCNC: <0.2 MG/DL — SIGNIFICANT CHANGE UP (ref 0–0.2)
BILIRUB INDIRECT FLD-MCNC: >0.1 MG/DL — LOW (ref 0.2–1.2)
BILIRUB SERPL-MCNC: 0.3 MG/DL — SIGNIFICANT CHANGE UP (ref 0.2–1.2)
BUN SERPL-MCNC: 61 MG/DL — CRITICAL HIGH (ref 10–20)
CALCIUM SERPL-MCNC: 8.7 MG/DL — SIGNIFICANT CHANGE UP (ref 8.5–10.1)
CHLORIDE SERPL-SCNC: 102 MMOL/L — SIGNIFICANT CHANGE UP (ref 98–110)
CK SERPL-CCNC: 1658 U/L — HIGH (ref 0–225)
CO2 SERPL-SCNC: 25 MMOL/L — SIGNIFICANT CHANGE UP (ref 17–32)
CREAT SERPL-MCNC: 3.8 MG/DL — HIGH (ref 0.7–1.5)
EOSINOPHIL # BLD AUTO: 0.47 K/UL — SIGNIFICANT CHANGE UP (ref 0–0.7)
EOSINOPHIL NFR BLD AUTO: 6.4 % — SIGNIFICANT CHANGE UP (ref 0–8)
GLUCOSE SERPL-MCNC: 78 MG/DL — SIGNIFICANT CHANGE UP (ref 70–99)
HCT VFR BLD CALC: 36.3 % — LOW (ref 42–52)
HGB BLD-MCNC: 11.6 G/DL — LOW (ref 14–18)
IMM GRANULOCYTES NFR BLD AUTO: 0.7 % — HIGH (ref 0.1–0.3)
LYMPHOCYTES # BLD AUTO: 1.43 K/UL — SIGNIFICANT CHANGE UP (ref 1.2–3.4)
LYMPHOCYTES # BLD AUTO: 19.3 % — LOW (ref 20.5–51.1)
MAGNESIUM SERPL-MCNC: 2 MG/DL — SIGNIFICANT CHANGE UP (ref 1.8–2.4)
MCHC RBC-ENTMCNC: 31.3 PG — HIGH (ref 27–31)
MCHC RBC-ENTMCNC: 32 G/DL — SIGNIFICANT CHANGE UP (ref 32–37)
MCV RBC AUTO: 97.8 FL — HIGH (ref 80–94)
MONOCYTES # BLD AUTO: 0.75 K/UL — HIGH (ref 0.1–0.6)
MONOCYTES NFR BLD AUTO: 10.1 % — HIGH (ref 1.7–9.3)
NEUTROPHILS # BLD AUTO: 4.67 K/UL — SIGNIFICANT CHANGE UP (ref 1.4–6.5)
NEUTROPHILS NFR BLD AUTO: 63.1 % — SIGNIFICANT CHANGE UP (ref 42.2–75.2)
NRBC # BLD: 0 /100 WBCS — SIGNIFICANT CHANGE UP (ref 0–0)
PLATELET # BLD AUTO: 157 K/UL — SIGNIFICANT CHANGE UP (ref 130–400)
POTASSIUM SERPL-MCNC: 5.1 MMOL/L — HIGH (ref 3.5–5)
POTASSIUM SERPL-SCNC: 5.1 MMOL/L — HIGH (ref 3.5–5)
PROT SERPL-MCNC: 5.4 G/DL — LOW (ref 6–8)
RBC # BLD: 3.71 M/UL — LOW (ref 4.7–6.1)
RBC # FLD: 12 % — SIGNIFICANT CHANGE UP (ref 11.5–14.5)
SODIUM SERPL-SCNC: 137 MMOL/L — SIGNIFICANT CHANGE UP (ref 135–146)
WBC # BLD: 7.4 K/UL — SIGNIFICANT CHANGE UP (ref 4.8–10.8)
WBC # FLD AUTO: 7.4 K/UL — SIGNIFICANT CHANGE UP (ref 4.8–10.8)

## 2021-07-18 PROCEDURE — 76705 ECHO EXAM OF ABDOMEN: CPT | Mod: 26

## 2021-07-18 PROCEDURE — 99233 SBSQ HOSP IP/OBS HIGH 50: CPT

## 2021-07-18 PROCEDURE — 99232 SBSQ HOSP IP/OBS MODERATE 35: CPT

## 2021-07-18 PROCEDURE — 73200 CT UPPER EXTREMITY W/O DYE: CPT | Mod: 26,RT

## 2021-07-18 RX ORDER — SODIUM CHLORIDE 9 MG/ML
1000 INJECTION INTRAMUSCULAR; INTRAVENOUS; SUBCUTANEOUS
Refills: 0 | Status: DISCONTINUED | OUTPATIENT
Start: 2021-07-18 | End: 2021-07-19

## 2021-07-18 RX ADMIN — Medication 100 MILLIGRAM(S): at 05:17

## 2021-07-18 RX ADMIN — Medication 100 MILLIGRAM(S): at 21:09

## 2021-07-18 RX ADMIN — SODIUM CHLORIDE 200 MILLILITER(S): 9 INJECTION INTRAMUSCULAR; INTRAVENOUS; SUBCUTANEOUS at 12:37

## 2021-07-18 RX ADMIN — HEPARIN SODIUM 5000 UNIT(S): 5000 INJECTION INTRAVENOUS; SUBCUTANEOUS at 17:08

## 2021-07-18 RX ADMIN — Medication 100 MILLIGRAM(S): at 13:18

## 2021-07-18 RX ADMIN — SODIUM CHLORIDE 125 MILLILITER(S): 9 INJECTION INTRAMUSCULAR; INTRAVENOUS; SUBCUTANEOUS at 18:23

## 2021-07-18 RX ADMIN — HEPARIN SODIUM 5000 UNIT(S): 5000 INJECTION INTRAVENOUS; SUBCUTANEOUS at 05:17

## 2021-07-18 NOTE — PROGRESS NOTE ADULT - SUBJECTIVE AND OBJECTIVE BOX
Pt seen and examined at bedside. No complaints.       VITAL SIGNS (Last 24 hrs):  T(C): 36.2 (07-18-21 @ 14:36), Max: 36.9 (07-17-21 @ 20:56)  HR: 66 (07-18-21 @ 14:36) (57 - 66)  BP: 133/90 (07-18-21 @ 14:36) (119/74 - 135/75)  RR: 18 (07-18-21 @ 14:36) (18 - 18)  SpO2: --  Wt(kg): --  Daily     Daily     I&O's Summary    18 Jul 2021 07:01  -  18 Jul 2021 15:24  ---------------------------------------------------   IN: 1750 mL / OUT: 2400 mL / NET: -650 mL        PHYSICAL EXAM:  GENERAL: NAD   HEAD:  Atraumatic, Normocephalic  EYES:  conjunctiva and sclera clear  NECK: Supple, No JVD  CHEST/LUNG: Clear to auscultation bilaterally; No wheeze  HEART: Regular rate and rhythm; No murmurs, rubs, or gallops  ABDOMEN: Soft, Nontender, Nondistended; Bowel sounds present  EXTREMITIES:  2+ Peripheral Pulses, No clubbing, cyanosis, or edema  PSYCH: AAOx3  NEUROLOGY: non-focal  SKIN: No rashes or lesions        Labs Reviewed  Spoke to patient in regards to abnormal labs.    CBC Full  -  ( 18 Jul 2021 06:49 )  WBC Count : 7.40 K/uL  Hemoglobin : 11.6 g/dL  Hematocrit : 36.3 %  Platelet Count - Automated : 157 K/uL  Mean Cell Volume : 97.8 fL  Mean Cell Hemoglobin : 31.3 pg  Mean Cell Hemoglobin Concentration : 32.0 g/dL  Auto Neutrophil # : 4.67 K/uL  Auto Lymphocyte # : 1.43 K/uL  Auto Monocyte # : 0.75 K/uL  Auto Eosinophil # : 0.47 K/uL  Auto Basophil # : 0.03 K/uL  Auto Neutrophil % : 63.1 %  Auto Lymphocyte % : 19.3 %  Auto Monocyte % : 10.1 %  Auto Eosinophil % : 6.4 %  Auto Basophil % : 0.4 %    BMP:    07-18 @ 06:49    Blood Urea Nitrogen - 61  Calcium - 8.7  Carbond Dioxide - 25  Chloride - 102  Creatinine - 3.8  Glucose - 78  Potassium - 5.1  Sodium - 137       Urine Culture:  07-16 @ 22:28 Urine culture: --    Culture Results:   No growth to date.  Method Type: --  Organism: --  Organism Identification: --  Specimen Source: .Blood Blood       MEDICATIONS  (STANDING):  clindamycin IVPB      clindamycin IVPB 600 milliGRAM(s) IV Intermittent every 8 hours  heparin   Injectable 5000 Unit(s) SubCutaneous every 12 hours  sodium chloride 0.9%. 1000 milliLiter(s) (125 mL/Hr) IV Continuous <Continuous>    MEDICATIONS  (PRN):  aluminum hydroxide/magnesium hydroxide/simethicone Suspension 30 milliLiter(s) Oral every 4 hours PRN Dyspepsia  melatonin 3 milliGRAM(s) Oral at bedtime PRN Insomnia  ondansetron Injectable 4 milliGRAM(s) IV Push every 8 hours PRN Nausea and/or Vomiting

## 2021-07-18 NOTE — PROGRESS NOTE ADULT - ASSESSMENT
Pt with no PMH presents 2 days after a fall while at [place]. Patient states there was a black out in the hotel and he tripped over a chair in his room. Pt thinks he blacked out from the fall and woke with pain to right arm, right ribs and right thigh. Over the next 2 days the pain worsened and he developed redness to the arm, thigh and now also to right upper arm. He denies fever but states he's been getting shaking chills. Labs remarkable for Cr 5.6 CK 6.9K ALT//98. CT head negative for acute bleed. Ptn admitted to medicine service for rhabdomyolysis secondary to presumed diffuse tissue injury s/p traumatic fall.   Likely seizure due benzo withdrawal Zanax.  Currently at baseline CK is trending down, VEEG is negative      Plan:  No need for AEDs  Outpatient counseling for substance use  Discontinue Zanax use  F/u PMD for insomnia      Plan discussed with neuroattending Dr. Agosto   Pt with no PMH presents 2 days after a fall while at [place]. Patient states there was a black out in the hotel and he tripped over a chair in his room. Pt thinks he blacked out from the fall and woke with pain to right arm, right ribs and right thigh. Over the next 2 days the pain worsened and he developed redness to the arm, thigh and now also to right upper arm. He denies fever but states he's been getting shaking chills. Labs remarkable for Cr 5.6 CK 6.9K ALT//98. CT head negative for acute bleed. Ptn admitted to medicine service for rhabdomyolysis secondary to presumed diffuse tissue injury s/p traumatic fall.   Likely seizure due benzo withdrawal Zanax.  Currently at baseline CK is trending down, VEEG is negative      Plan:  No need for AEDs  Outpatient counseling for substance use  Discontinue Xanax use  F/u PMD for insomnia  no further inpt neurologic w/u      Plan discussed with neuroattending Dr. Agosto

## 2021-07-18 NOTE — EEG REPORT - NS EEG TEXT BOX
VIDEO EEG FINAL REPORT      JOCY KENNEDY    45y Male  MRN [id number]      Recording Technique: The patient underwent continuous video-EEG monitoring using Telemetry System hardware on the [employer] System. EEG and video data were stored on a computer hard drive with important events saved in digital archive files. The material was reviewed by a physician (electroencephalographer/epileptologist) on a daily basis. River and seizure detection algorithms were utilized if needed. An EEG technician attended to the patient for 8 to 10 hours per day. The patient was observed by the epilepsy nursing staff 24 hrs per day. The epilepsy center neurologist was available in person or on call 24 hours per day.    Placement and Labeling of Eelectrodes: The EEG was performed using at least 20 channel referential EEG connections (coronal over temporal over parasaggital montage) with inferior temporal electrodes when indicting and using all standard 10-20 electrode placement with EKG, with additional electrodes placed in the inferior temporal region using the modified 10-10 montage electrode placements for elective admissions, or if deemed necessary. Recording was at a sampling rate of 256 samples per second per channel. Time syncronized digital video recording was done simultaneously with EEG recording. A low light infrared camera was used for low light recording.      HPI:  Pt with no PMH presents 2 days after a fall while at [place]. Patient states there was a black out in the hotel and he tripped over a chair in his room. Pt thinks he blacked out from the fall and woke with pain to right arm, right ribs and right thigh. Over the next 2 days the pain worsened and he developed redness to the arm, thigh and now also to right upper arm. He denies fever but states he's been getting shaking chills. He states he has a red area that had all blisters on his right thigh which was leaking pus and he popped it.  Denies IVDU or substance abuse.chills. Admits to HA and NV today. Denies drug use. Was not intoxicated at time of fall. Length of LOC was unknown. Denies diarrhea/constipation / recent sick contacts / weakness    In the ED  BP  136/91  HR 62  RR 17  T 98.7  SpO2 100% on room air. Labs remarkable for Cr 5.6 CK 6.9K ALT//98. CT head negative for acute bleed. Ptn admitted to medicine service for rhabdomyolysis secondary to presumed diffuse tissue injury s/p traumatic fall under questionable circumstances   (17 Jul 2021 01:27)      MEDICATIONS  (STANDING):  clindamycin IVPB      clindamycin IVPB 600 milliGRAM(s) IV Intermittent every 8 hours  heparin   Injectable 5000 Unit(s) SubCutaneous every 12 hours  sodium chloride 0.9%. 1000 milliLiter(s) (200 mL/Hr) IV Continuous <Continuous>    MEDICATIONS  (PRN):  aluminum hydroxide/magnesium hydroxide/simethicone Suspension 30 milliLiter(s) Oral every 4 hours PRN Dyspepsia  melatonin 3 milliGRAM(s) Oral at bedtime PRN Insomnia  ondansetron Injectable 4 milliGRAM(s) IV Push every 8 hours PRN Nausea and/or Vomiting        AWAKE  The recording during the wakefulness consists of a symmetrical and well-organized background and posterior dominant rhythm in the range of 9-10 Hz, which is reactive to eye opening and eye closure and change in the level of alertness.      ASLEEP  Different stages of sleep were preserved well. Stage II of non-REM sleep was characterized by the presence of symmetrical and well-defined sleep spindles and vertex sharp waves. The deeper stages of sleep were identified by the presence of low theta and delta range activity seen diffusely over both hemispheres and more prominently from the frontal and central derivations. All stages captured and symmetric.      GENERALIZED SLOWING  None    FOCAL SLOWING    None  BREACH ARTIFACT  None    ACTIVATION PROCEDURES  Hyperventilation:  Photic Stimulation:    NONE  SLEEP DEPRIVATION/MEDICATION REDUCTION      EPILEPTIFORM ACTIVITY  None  At the end of the recording, in sleep, there was  3-4 second period of sharp transients  in the central lead. The sharp waves did not appear epileptiform.           EVENTS/SEIZURES    None  IMPRESSION  Normal VEEG   The sharp transients appear to be intermixed into the sleep pattern  CLINICAL CORRELATION  A normal VEEG study does not exclude the clinical diagnosis of epilepsy but no supporting evidence was present on this study.

## 2021-07-18 NOTE — PROGRESS NOTE ADULT - ASSESSMENT
This is a 45 year old male presents with syncopal fall resulting in rhabdomyolysis. Found to have BELEN.     #Traumatic Rhabdomyolysis     - CPK elevated   - c/w IVF     #Nonoliguric BELEN, pre-renal vs ATN   - low k diet   - c/w IVF     #Transaminitis   - pattern not consistent with rhabdo and alcoholic injury   - check hep panel, liver US     #Nonpurulent Cellulitis   - c/w clindamycin     #Fall resulting in syncope   - CT head negative, VEEG negative, Utox positive for cocaine, benzo, opiates (received morphine in ED)     DVT Prophylaxis: Heparin subcutaneous   GI Prophylaxis: Pantoprazole    #Progress Note Handoff  Pending (specify): IVF, belen improvements   Family discussion: d/w pt plan as above   Disposition: Home

## 2021-07-18 NOTE — PROGRESS NOTE ADULT - SUBJECTIVE AND OBJECTIVE BOX
Neurology Progress Note    Interval History:  patient is examined at the bedside he is doing well, no complaints, VEEG is normal. Patient admitted to everyday Xanax use and that he skipped taking it for 3days. Also Utox positive for opiates and cocaine.    HPI:  Pt with no PMH presents 2 days after a fall while at [place]. Patient states there was a black out in the hotel and he tripped over a chair in his room. Pt thinks he blacked out from the fall and woke with pain to right arm, right ribs and right thigh. Over the next 2 days the pain worsened and he developed redness to the arm, thigh and now also to right upper arm. He denies fever but states he's been getting shaking chills. He states he has a red area that had all blisters on his right thigh which was leaking pus and he popped it.  Denies IVDU or substance abuse.chills. Admits to HA and NV today. Denies drug use. Was not intoxicated at time of fall. Length of LOC was unknown. Denies diarrhea/constipation / recent sick contacts / weakness    In the ED  BP  136/91  HR 62  RR 17  T 98.7  SpO2 100% on room air. Labs remarkable for Cr 5.6 CK 6.9K ALT//98. CT head negative for acute bleed. Ptn admitted to medicine service for rhabdomyolysis secondary to presumed diffuse tissue injury s/p traumatic fall under questionable circumstances   (2021 01:27)      PAST MEDICAL & SURGICAL HISTORY:          Medications:  aluminum hydroxide/magnesium hydroxide/simethicone Suspension 30 milliLiter(s) Oral every 4 hours PRN  clindamycin IVPB      clindamycin IVPB 600 milliGRAM(s) IV Intermittent every 8 hours  heparin   Injectable 5000 Unit(s) SubCutaneous every 12 hours  melatonin 3 milliGRAM(s) Oral at bedtime PRN  ondansetron Injectable 4 milliGRAM(s) IV Push every 8 hours PRN  sodium chloride 0.9%. 1000 milliLiter(s) IV Continuous <Continuous>      Vital Signs Last 24 Hrs  T(C): 36.2 (2021 14:36), Max: 36.9 (2021 20:56)  T(F): 97.1 (2021 14:36), Max: 98.4 (2021 20:56)  HR: 66 (2021 14:36) (57 - 66)  BP: 133/90 (2021 14:36) (119/74 - 135/75)  BP(mean): --  RR: 18 (2021 14:36) (18 - 18)  SpO2: --    Neurological Exam:   Mental status: Awake, alert and oriented x3.  Recent and remote memory intact.  Naming, repetition and comprehension intact.  Attention/concentration intact.  No dysarthria, no aphasia.  Fund of knowledge appropriate.    Cranial nerves: Pupils equally round and reactive to light, visual fields full, no nystagmus, extraocular muscles intact, V1 through V3 intact bilaterally and symmetric, face symmetric, hearing intact to finger rub, palate elevation symmetric, tongue was midline.  Motor:  MRC grading 5/5 b/l UE/LE.   strength 5/5.  Normal tone and bulk.  No abnormal movements.    Sensation: Intact to light touch, proprioception, and pinprick.   Coordination: No dysmetria on finger-to-nose and heel-to-shin.  No dysdiadokinesia.  Reflexes: 2+ in bilateral UE/LE, downgoing toes bilaterally. (-) Anderson.      Labs:  CBC Full  -  ( 2021 06:49 )  WBC Count : 7.40 K/uL  RBC Count : 3.71 M/uL  Hemoglobin : 11.6 g/dL  Hematocrit : 36.3 %  Platelet Count - Automated : 157 K/uL  Mean Cell Volume : 97.8 fL  Mean Cell Hemoglobin : 31.3 pg  Mean Cell Hemoglobin Concentration : 32.0 g/dL  Auto Neutrophil # : 4.67 K/uL  Auto Lymphocyte # : 1.43 K/uL  Auto Monocyte # : 0.75 K/uL  Auto Eosinophil # : 0.47 K/uL  Auto Basophil # : 0.03 K/uL  Auto Neutrophil % : 63.1 %  Auto Lymphocyte % : 19.3 %  Auto Monocyte % : 10.1 %  Auto Eosinophil % : 6.4 %  Auto Basophil % : 0.4 %        137  |  102  |  61<HH>  ----------------------------<  78  5.1<H>   |  25  |  3.8<H>    Ca    8.7      2021 06:49  Mg     2.0         TPro  5.4<L>  /  Alb  3.3<L>  /  TBili  0.3  /  DBili  <0.2  /  AST  36  /  ALT  233<H>  /  AlkPhos  67      LIVER FUNCTIONS - ( 2021 16:00 )  Alb: 3.3 g/dL / Pro: 5.4 g/dL / ALK PHOS: 67 U/L / ALT: 233 U/L / AST: 36 U/L / GGT: x             Urinalysis Basic - ( 2021 23:56 )    Color: Colorless / Appearance: Clear / S.007 / pH: x  Gluc: x / Ketone: Negative  / Bili: Negative / Urobili: <2 mg/dL   Blood: x / Protein: Trace / Nitrite: Negative   Leuk Esterase: Negative / RBC: 5 /HPF / WBC 0 /HPF   Sq Epi: x / Non Sq Epi: 2 /HPF / Bacteria: Negative        Assessment:  This is a 45y Male w/ h/o     Plan: Neurology Progress Note    Interval History:  patient is examined at the bedside he is doing well, no complaints, VEEG is normal. Patient admitted to everyday Xanax use and that he skipped taking it for 3days. Also Utox positive for opiates and cocaine.     HPI:  Pt with no PMH presents 2 days after a fall while at [place]. Patient states there was a black out in the hotel and he tripped over a chair in his room. Pt thinks he blacked out from the fall and woke with pain to right arm, right ribs and right thigh. Over the next 2 days the pain worsened and he developed redness to the arm, thigh and now also to right upper arm. He denies fever but states he's been getting shaking chills. He states he has a red area that had all blisters on his right thigh which was leaking pus and he popped it.  Denies IVDU or substance abuse.chills. Admits to HA and NV today. Denies drug use. Was not intoxicated at time of fall. Length of LOC was unknown. Denies diarrhea/constipation / recent sick contacts / weakness    In the ED  BP  136/91  HR 62  RR 17  T 98.7  SpO2 100% on room air. Labs remarkable for Cr 5.6 CK 6.9K ALT//98. CT head negative for acute bleed. Ptn admitted to medicine service for rhabdomyolysis secondary to presumed diffuse tissue injury s/p traumatic fall under questionable circumstances   (2021 01:27)      PAST MEDICAL & SURGICAL HISTORY:          Medications:  aluminum hydroxide/magnesium hydroxide/simethicone Suspension 30 milliLiter(s) Oral every 4 hours PRN  clindamycin IVPB      clindamycin IVPB 600 milliGRAM(s) IV Intermittent every 8 hours  heparin   Injectable 5000 Unit(s) SubCutaneous every 12 hours  melatonin 3 milliGRAM(s) Oral at bedtime PRN  ondansetron Injectable 4 milliGRAM(s) IV Push every 8 hours PRN  sodium chloride 0.9%. 1000 milliLiter(s) IV Continuous <Continuous>      Vital Signs Last 24 Hrs  T(C): 36.2 (2021 14:36), Max: 36.9 (2021 20:56)  T(F): 97.1 (2021 14:36), Max: 98.4 (2021 20:56)  HR: 66 (2021 14:36) (57 - 66)  BP: 133/90 (2021 14:36) (119/74 - 135/75)  BP(mean): --  RR: 18 (2021 14:36) (18 - 18)  SpO2: --    Neurological Exam:   Mental status: Awake, alert and oriented x3.  Recent and remote memory intact.  Naming, repetition and comprehension intact.  Attention/concentration intact.  No dysarthria, no aphasia.  Fund of knowledge appropriate.    Cranial nerves: Pupils equally round and reactive to light, visual fields full, no nystagmus, extraocular muscles intact, V1 through V3 intact bilaterally and symmetric, face symmetric, hearing intact to finger rub, palate elevation symmetric, tongue was midline.  Motor:  MRC grading 5/5 b/l UE/LE.   strength 5/5.  Normal tone and bulk.  No abnormal movements.    Sensation: Intact to light touch, proprioception, and pinprick.   Coordination: No dysmetria on finger-to-nose and heel-to-shin.  No dysdiadokinesia.  Reflexes: 2+ in bilateral UE/LE, downgoing toes bilaterally. (-) Anderson.      Labs:  CBC Full  -  ( 2021 06:49 )  WBC Count : 7.40 K/uL  RBC Count : 3.71 M/uL  Hemoglobin : 11.6 g/dL  Hematocrit : 36.3 %  Platelet Count - Automated : 157 K/uL  Mean Cell Volume : 97.8 fL  Mean Cell Hemoglobin : 31.3 pg  Mean Cell Hemoglobin Concentration : 32.0 g/dL  Auto Neutrophil # : 4.67 K/uL  Auto Lymphocyte # : 1.43 K/uL  Auto Monocyte # : 0.75 K/uL  Auto Eosinophil # : 0.47 K/uL  Auto Basophil # : 0.03 K/uL  Auto Neutrophil % : 63.1 %  Auto Lymphocyte % : 19.3 %  Auto Monocyte % : 10.1 %  Auto Eosinophil % : 6.4 %  Auto Basophil % : 0.4 %        137  |  102  |  61<HH>  ----------------------------<  78  5.1<H>   |  25  |  3.8<H>    Ca    8.7      2021 06:49  Mg     2.0         TPro  5.4<L>  /  Alb  3.3<L>  /  TBili  0.3  /  DBili  <0.2  /  AST  36  /  ALT  233<H>  /  AlkPhos  67      LIVER FUNCTIONS - ( 2021 16:00 )  Alb: 3.3 g/dL / Pro: 5.4 g/dL / ALK PHOS: 67 U/L / ALT: 233 U/L / AST: 36 U/L / GGT: x             Urinalysis Basic - ( 2021 23:56 )    Color: Colorless / Appearance: Clear / S.007 / pH: x  Gluc: x / Ketone: Negative  / Bili: Negative / Urobili: <2 mg/dL   Blood: x / Protein: Trace / Nitrite: Negative   Leuk Esterase: Negative / RBC: 5 /HPF / WBC 0 /HPF   Sq Epi: x / Non Sq Epi: 2 /HPF / Bacteria: Negative        Assessment:  This is a 45y Male w/ h/o     Plan:

## 2021-07-18 NOTE — CONSULT NOTE ADULT - ASSESSMENT
46 yo man with no PMH presenting to the hospital sp syncope /BELEN    ·	BELEN prerenal ? rhabdo mild ? ATN   ·	creat better   ·	agree with IVF NS current  ·	follow serial CK- BMP  check  TSH   ·	check U protein creat ratio urine lytes   ·	sono noted no hydro  ·	no need for RRT    will follow

## 2021-07-18 NOTE — CONSULT NOTE ADULT - SUBJECTIVE AND OBJECTIVE BOX
NEPHROLOGY CONSULTATION NOTE    THIS CONSULT IS INCOMPLETE / FULL CONSULT TO FOLLOW    Patient is a 45y Male whom presented to the hospital with     PAST MEDICAL & SURGICAL HISTORY:    Allergies:  No Known Allergies    Home Medications Reviewed  Hospital Medications:   MEDICATIONS  (STANDING):  clindamycin IVPB      clindamycin IVPB 600 milliGRAM(s) IV Intermittent every 8 hours  heparin   Injectable 5000 Unit(s) SubCutaneous every 12 hours  sodium chloride 0.9%. 1000 milliLiter(s) (200 mL/Hr) IV Continuous <Continuous>      SOCIAL HISTORY:  Denies ETOH,Smoking,   FAMILY HISTORY:        REVIEW OF SYSTEMS:  CONSTITUTIONAL: No weakness, fevers or chills  EYES/ENT: No visual changes;  No vertigo or throat pain   NECK: No pain or stiffness  RESPIRATORY: No cough, wheezing, hemoptysis; No shortness of breath  CARDIOVASCULAR: No chest pain or palpitations.  GASTROINTESTINAL: No abdominal or epigastric pain. No nausea, vomiting, or hematemesis; No diarrhea or constipation. No melena or hematochezia.  GENITOURINARY: No dysuria, frequency, foamy urine, urinary urgency, incontinence or hematuria  NEUROLOGICAL: No numbness or weakness  SKIN: No itching, burning, rashes, or lesions   VASCULAR: No bilateral lower extremity edema.   All other review of systems is negative unless indicated above.    VITALS:  T(F): 98.1 (21 @ 05:04), Max: 98.4 (21 @ 20:56)  HR: 57 (21 @ 05:04)  BP: 119/74 (21 @ 05:04)  RR: 18 (21 @ 05:04)  SpO2: --        I&O's Detail    Creatine Kinase, Serum: 3634 U/L (21 @ 11:50)      PHYSICAL EXAM:  Constitutional: NAD  HEENT: anicteric sclera, oropharynx clear, MMM  Neck: No JVD  Respiratory: CTAB, no wheezes, rales or rhonchi  Cardiovascular: S1, S2, RRR  Gastrointestinal: BS+, soft, NT/ND  Extremities: No cyanosis or clubbing. No peripheral edema  Neurological: A/O x 3, no focal deficits  Psychiatric: Normal mood, normal affect  : No CVA tenderness. No murguia.   Skin: No rashes  Vascular Access:    LABS:      136  |  101  |  73<HH>  ----------------------------<  77  5.0   |  27  |  4.5<HH>    Ca    8.6      2021 11:50  Mg     2.1         TPro  5.5<L>  /  Alb  3.4<L>  /  TBili  0.3  /  DBili  <0.2  /  AST  56<H>  /  ALT  331<H>  /  AlkPhos  68      Creatinine Trend: 4.5 <--, 5.6 <--                        11.6   7.40  )-----------( 157      ( 2021 06:49 )             36.3     Urine Studies:  Urinalysis Basic - ( 2021 23:56 )    Color: Colorless / Appearance: Clear / S.007 / pH:   Gluc:  / Ketone: Negative  / Bili: Negative / Urobili: <2 mg/dL   Blood:  / Protein: Trace / Nitrite: Negative   Leuk Esterase: Negative / RBC: 5 /HPF / WBC 0 /HPF   Sq Epi:  / Non Sq Epi: 2 /HPF / Bacteria: Negative                RADIOLOGY & ADDITIONAL STUDIES:                 NEPHROLOGY CONSULTATION NOTE  Patient is a 45y Male whom presented to the hospital with syncope. History goes back to 2 days ptp when patient had a syncope at his hotel [place]rom [place] . Patient said he lost consciousness for 40 minutes / denied any head trauma no focal motor sensory deficit no seizure disorder. Denied any ETOH abuse no chest pain no SOB no cough no sputum production no dysuria no hematuria.  Denied any NSAID's use no diarrhea no abdominal pain   Patient had swelling and pain right arm     PAST MEDICAL & SURGICAL HISTORY:    Allergies:  No Known Allergies    Home Medications Reviewed  Hospital Medications:   MEDICATIONS  (STANDING):    clindamycin IVPB 600 milliGRAM(s) IV Intermittent every 8 hours  heparin   Injectable 5000 Unit(s) SubCutaneous every 12 hours  sodium chloride 0.9%. 1000 milliLiter(s) (200 mL/Hr) IV Continuous <Continuous>      SOCIAL HISTORY:  Denies ETOH,Smoking,   FAMILY HISTORY:        REVIEW OF SYSTEMS:  All other review of systems is negative unless indicated above.    VITALS:  T(F): 98.1 (21 @ 05:04), Max: 98.4 (21 @ 20:56)  HR: 57 (21 @ 05:04)  BP: 119/74 (21 @ 05:04)  RR: 18 (21 @ 05:04)  SpO2: --        I&O's Detail    Creatine Kinase, Serum: 3634 U/L (21 @ 11:50)      PHYSICAL EXAM:  Constitutional: NAD  Neck: No JVD  Respiratory: CTAB,   Cardiovascular: S1, S2, RRR  Gastrointestinal: BS+, soft, NT/ND  Extremities: No cyanosis or clubbing. right  upper ext edema   Psychiatric: Normal mood, normal affect  : No CVA tenderness. No murguia.   Skin: No rashes  Vascular Access:    LABS:      137  |  102  |  61<HH>  ----------------------------<  78  5.1<H>   |  25  |  3.8<H>    Ca    8.7      2021 06:49  Mg     2.0         TPro  5.5<L>  /  Alb  3.4<L>  /  TBili  0.3  /  DBili  <0.2  /  AST  56<H>  /  ALT  331<H>  /  AlkPhos  68      136  |  101  |  73<HH>  ----------------------------<  77  5.0   |  27  |  4.5<HH>    Ca    8.6      2021 11:50  Mg     2.1         TPro  5.5<L>  /  Alb  3.4<L>  /  TBili  0.3  /  DBili  <0.2  /  AST  56<H>  /  ALT  331<H>  /  AlkPhos  68      Creatinine Trend: 4.5 <--, 5.6 <--                        11.6   7.40  )-----------( 157      ( 2021 06:49 )             36.3     Creatine Kinase, Serum: 6923: Hemolyzed. Interpret with caution U/L (21 @ 22:28)      Urine Studies:  Urinalysis Basic - ( 2021 23:56 )    Color: Colorless / Appearance: Clear / S.007 / pH:   Gluc:  / Ketone: Negative  / Bili: Negative / Urobili: <2 mg/dL   Blood:  / Protein: Trace / Nitrite: Negative   Leuk Esterase: Negative / RBC: 5 /HPF / WBC 0 /HPF   Sq Epi:  / Non Sq Epi: 2 /HPF / Bacteria: Negative                RADIOLOGY & ADDITIONAL STUDIES:    < from: US Kidney and Bladder (21 @ 02:42) >    IMPRESSION:    No hydronephrosis.    Prevoid urinary bladder volume approximately 128 cc. Patient unable to void during examination.    < end of copied text >    < from: CT Upper Extremity No Cont, Right (21 @ 08:49) >  Impression:    1. No acute fractures identified.    2. There is diffuse subcutaneous soft tissue swelling extending from the mid humeral level down to the hand. No focal drainable fluid collection.    3. Partially imaged right thigh intermuscular edema and right lateral thigh subcutaneous soft tissue swelling.    < end of copied text >

## 2021-07-19 LAB
ALBUMIN SERPL ELPH-MCNC: 3.5 G/DL — SIGNIFICANT CHANGE UP (ref 3.5–5.2)
ALP SERPL-CCNC: 79 U/L — SIGNIFICANT CHANGE UP (ref 30–115)
ALT FLD-CCNC: 173 U/L — HIGH (ref 0–41)
ANION GAP SERPL CALC-SCNC: 5 MMOL/L — LOW (ref 7–14)
APTT BLD: 32.4 SEC — SIGNIFICANT CHANGE UP (ref 27–39.2)
AST SERPL-CCNC: 32 U/L — SIGNIFICANT CHANGE UP (ref 0–41)
BASOPHILS # BLD AUTO: 0.04 K/UL — SIGNIFICANT CHANGE UP (ref 0–0.2)
BASOPHILS NFR BLD AUTO: 0.6 % — SIGNIFICANT CHANGE UP (ref 0–1)
BILIRUB SERPL-MCNC: 0.3 MG/DL — SIGNIFICANT CHANGE UP (ref 0.2–1.2)
BUN SERPL-MCNC: 49 MG/DL — HIGH (ref 10–20)
CALCIUM SERPL-MCNC: 8.6 MG/DL — SIGNIFICANT CHANGE UP (ref 8.5–10.1)
CHLORIDE SERPL-SCNC: 105 MMOL/L — SIGNIFICANT CHANGE UP (ref 98–110)
CK SERPL-CCNC: 1188 U/L — HIGH (ref 0–225)
CO2 SERPL-SCNC: 28 MMOL/L — SIGNIFICANT CHANGE UP (ref 17–32)
COVID-19 SPIKE DOMAIN AB INTERP: POSITIVE
COVID-19 SPIKE DOMAIN ANTIBODY RESULT: 11.6 U/ML — HIGH
CREAT SERPL-MCNC: 3.1 MG/DL — HIGH (ref 0.7–1.5)
EOSINOPHIL # BLD AUTO: 0.38 K/UL — SIGNIFICANT CHANGE UP (ref 0–0.7)
EOSINOPHIL NFR BLD AUTO: 5.7 % — SIGNIFICANT CHANGE UP (ref 0–8)
GLUCOSE SERPL-MCNC: 103 MG/DL — HIGH (ref 70–99)
HCT VFR BLD CALC: 37.4 % — LOW (ref 42–52)
HGB BLD-MCNC: 11.7 G/DL — LOW (ref 14–18)
IMM GRANULOCYTES NFR BLD AUTO: 0.8 % — HIGH (ref 0.1–0.3)
INR BLD: 0.97 RATIO — SIGNIFICANT CHANGE UP (ref 0.65–1.3)
LACTATE SERPL-SCNC: 0.6 MMOL/L — LOW (ref 0.7–2)
LYMPHOCYTES # BLD AUTO: 1.39 K/UL — SIGNIFICANT CHANGE UP (ref 1.2–3.4)
LYMPHOCYTES # BLD AUTO: 20.9 % — SIGNIFICANT CHANGE UP (ref 20.5–51.1)
MAGNESIUM SERPL-MCNC: 1.9 MG/DL — SIGNIFICANT CHANGE UP (ref 1.8–2.4)
MCHC RBC-ENTMCNC: 31 PG — SIGNIFICANT CHANGE UP (ref 27–31)
MCHC RBC-ENTMCNC: 31.3 G/DL — LOW (ref 32–37)
MCV RBC AUTO: 99.2 FL — HIGH (ref 80–94)
MONOCYTES # BLD AUTO: 0.71 K/UL — HIGH (ref 0.1–0.6)
MONOCYTES NFR BLD AUTO: 10.7 % — HIGH (ref 1.7–9.3)
NEUTROPHILS # BLD AUTO: 4.07 K/UL — SIGNIFICANT CHANGE UP (ref 1.4–6.5)
NEUTROPHILS NFR BLD AUTO: 61.3 % — SIGNIFICANT CHANGE UP (ref 42.2–75.2)
NRBC # BLD: 0 /100 WBCS — SIGNIFICANT CHANGE UP (ref 0–0)
PLATELET # BLD AUTO: 152 K/UL — SIGNIFICANT CHANGE UP (ref 130–400)
POTASSIUM SERPL-MCNC: 4.9 MMOL/L — SIGNIFICANT CHANGE UP (ref 3.5–5)
POTASSIUM SERPL-SCNC: 4.9 MMOL/L — SIGNIFICANT CHANGE UP (ref 3.5–5)
PROT SERPL-MCNC: 5.5 G/DL — LOW (ref 6–8)
PROTHROM AB SERPL-ACNC: 11.1 SEC — SIGNIFICANT CHANGE UP (ref 9.95–12.87)
RBC # BLD: 3.77 M/UL — LOW (ref 4.7–6.1)
RBC # FLD: 11.8 % — SIGNIFICANT CHANGE UP (ref 11.5–14.5)
SARS-COV-2 IGG+IGM SERPL QL IA: 11.6 U/ML — HIGH
SARS-COV-2 IGG+IGM SERPL QL IA: POSITIVE
SODIUM SERPL-SCNC: 138 MMOL/L — SIGNIFICANT CHANGE UP (ref 135–146)
TSH SERPL-MCNC: 1.1 UIU/ML — SIGNIFICANT CHANGE UP (ref 0.27–4.2)
WBC # BLD: 6.64 K/UL — SIGNIFICANT CHANGE UP (ref 4.8–10.8)
WBC # FLD AUTO: 6.64 K/UL — SIGNIFICANT CHANGE UP (ref 4.8–10.8)

## 2021-07-19 PROCEDURE — 99232 SBSQ HOSP IP/OBS MODERATE 35: CPT

## 2021-07-19 RX ADMIN — SODIUM CHLORIDE 125 MILLILITER(S): 9 INJECTION INTRAMUSCULAR; INTRAVENOUS; SUBCUTANEOUS at 08:36

## 2021-07-19 RX ADMIN — Medication 100 MILLIGRAM(S): at 05:45

## 2021-07-19 NOTE — PROGRESS NOTE ADULT - SUBJECTIVE AND OBJECTIVE BOX
GENERAL SURGERY PROGRESS NOTE    Patient: JOCY KENNEDY , 45y ([birth year])Male   MRN: [id number]  Location: [facility] [id number] B  Visit: 07-17-21 Inpatient  Date: 07-19-21 @ 03:10    Hospital Day #:3  Post-Op Day #:    Procedure/Dx/Injuries: Rhabdomyolysis     Events of past 24 hours:  Pt still has pain in the right wrist. Decrease range of motion at level of wrist     PAST MEDICAL & SURGICAL HISTORY:    Vitals:   T(F): 98.9 (07-18-21 @ 21:44), Max: 98.9 (07-18-21 @ 21:44)  HR: 63 (07-18-21 @ 21:44)  BP: 139/84 (07-18-21 @ 21:44)  RR: 18 (07-18-21 @ 21:44)  SpO2: --    Diet, Regular:   No Concentrated Potassium  No Concentrated Phosphorus  Low Sodium    Fluids: sodium chloride 0.9%.: Solution, 1000 milliLiter(s) infuse at 125 mL/Hr  Provider's Contact #: [phone]    I & O's:    PHYSICAL EXAM:  General: NAD, calm and cooperative  HEENT: NC/AT, EOMI, Trachea ML, Neck supple  Cardiac: RRR S1, S2  Respiratory: CTAB, normal respiratory effort, breath sounds equal BL,   Abdomen: Soft, non-distended, non-tender   Musculoskeletal: Decrease ROM at level of right wrist and digits.   Neuro: Sensation grossly intact, no focal deficits    MEDICATIONS  (STANDING):  clindamycin IVPB      clindamycin IVPB 600 milliGRAM(s) IV Intermittent every 8 hours  heparin   Injectable 5000 Unit(s) SubCutaneous every 12 hours  sodium chloride 0.9%. 1000 milliLiter(s) (125 mL/Hr) IV Continuous <Continuous>    MEDICATIONS  (PRN):  aluminum hydroxide/magnesium hydroxide/simethicone Suspension 30 milliLiter(s) Oral every 4 hours PRN Dyspepsia  melatonin 3 milliGRAM(s) Oral at bedtime PRN Insomnia  ondansetron Injectable 4 milliGRAM(s) IV Push every 8 hours PRN Nausea and/or Vomiting    DVT PROPHYLAXIS: heparin   Injectable 5000 Unit(s) SubCutaneous every 12 hours    GI PROPHYLAXIS:   ANTICOAGULATION:   ANTIBIOTICS:  clindamycin IVPB    clindamycin IVPB 600 milliGRAM(s)      LAB/STUDIES:  Labs:  CAPILLARY BLOOD GLUCOSE                        11.6   7.40  )-----------( 157      ( 18 Jul 2021 06:49 )             36.3       Auto Neutrophil %: 63.1 % (07-18-21 @ 06:49)  Auto Immature Granulocyte %: 0.7 % (07-18-21 @ 06:49)    07-18    137  |  102  |  61<HH>  ----------------------------<  78  5.1<H>   |  25  |  3.8<H>    Calcium, Total Serum: 8.7 mg/dL (07-18-21 @ 06:49)    LFTs:             5.4  | 0.3  | 36       ------------------[67      ( 18 Jul 2021 16:00 )  3.3  | <0.2 | 233         Lipase:x      Amylase:x        Coags:    CARDIAC MARKERS ( 18 Jul 2021 06:49 )  x     / x     / 1658 U/L / x     / x      CARDIAC MARKERS ( 17 Jul 2021 11:50 )  x     / x     / 3634 U/L / x     / x          Culture - Blood (collected 16 Jul 2021 22:28)  Source: .Blood Blood  Preliminary Report (18 Jul 2021 09:01):    No growth to date.    Culture - Blood (collected 16 Jul 2021 22:28)  Source: .Blood Blood  Preliminary Report (18 Jul 2021 09:01):    No growth to date.    IMAGING:  < from: US Abdomen Upper Quadrant Right (07.18.21 @ 17:46) >    IMPRESSION:    No sonographic evidence of cholelithiasis or acute cholecystitis.    Borderline-enlarged common bile duct measures approximately 7 mm.    --- End of Report ---    < from: CT Upper Extremity No Cont, Right (07.18.21 @ 08:49) >  Impression:    1. No evidence of acute fracture or dislocation.    2. Diffuse subcutaneous swelling involving the soft tissues along the volar aspect of the right distal humerus and forearm; no focal drainable fluid collection.    --- End of Report ---    < end of copied text >      ACCESS/ DEVICES:  [ x] Peripheral IV  [ ] Central Venous Line	[ ] R	[ ] L	[ ] IJ	[ ] Fem	[ ] SC	Placed:   [ ] Arterial Line		[ ] R	[ ] L	[ ] Fem	[ ] Rad	[ ] Ax	Placed:   [ ] PICC:					[ ] Mediport  [ ] Urinary Catheter,  Date Placed:   [ ] Chest tube: [ ] Right, [ ] Left  [ ] AMI/Puneet Drains

## 2021-07-19 NOTE — PROGRESS NOTE ADULT - ATTENDING COMMENTS
#Rhabdo  resolving, c/b rachel  scr improving s/p ivf  trend ck, scr  ua noted  #Transaminitis, suspected due to rhabdo  ruq us neg  f/u acute hepatitis  resolving  #Syncope, in setting of benzo, opioid, cocaine abuse  suspected intoxication/ overdose  pt unable to recount history  eeg neg  cth neg  appreciate neuro, no further w/u    #Progress Note Handoff:  Pending (specify):  Consults_________, Tests________, Test Results_______, Other___improvement in aki______  Family discussion: d/w pt at bedside re: treatment plan, primary dx  Disposition: Home_x__/SNF___/Other________/Unknown at this time________
I have personally seen and examined this patient.  I have fully participated in the care of this patient.  I have reviewed all pertinent clinical information, including history, physical exam, plan and note.   I have reviewed all pertinent clinical information and reviewed all relevant imaging and diagnostic studies personally.  Pt w/ unwitnessed LOC prolonged duration with (+) UTox w/ negative neurologic w/u.  suspect overdose vs benzo w/d seizure.  Recommendations as above.  Agree with above assessment except as noted.

## 2021-07-19 NOTE — PROGRESS NOTE ADULT - ASSESSMENT
Pt with no PMH presents 2 days after stating he had a fall with LOC while at [place] with pain, swelling and ecchymosis to right arm, right ribs and right thigh, worsening now with assocated chills, HA and NV today. Admitted to medicine service for rhabdomyolysis secondary to presumed diffuse tissue injury s/p traumatic fall under questionable circumstances    #Rhabdomyolysis secondary to diffuse tissue injury   - After sustaining traumatic fall? History provided does not corroborate with type/extent of tissue injury  - Cr 5.6 Creatine Kinase 6.9K,   - denies any use of drugs of abuse that can cause tissue ischaemia (alcohol/amphetamines/cocaine etc)  - Start IVF LR 200cc/hr (avoid NS for now d/t hyperkalemia)  - f/u urine drug screen  - Social work consult for high suspicion violent attack--> ptn may be at risk once discharged    #Hyperkalemia on admission  - likely secondary to difuse tissue injury and intracellular release  - f/u am EKG  - f/u BMP tonight    #Elevated Liver Enzymes secondary to hepatic injury  - ALT//98  - unkown cause? Denies drug ingestion,   - possibly secondary to blunt force abdominal trauma Ecchymosis and bruising over RUQ)  - f/u RUQ ultrasound, trend enzymes daily  - Avoid hepatotoxic medications  - f/u urine drug screen, Hepatitis panel    #Cellulitis of Right Thigh  - IV Abx    #Misc  - DVT Prophylaxis: Heparin SubQ  - GI Prophylaxis: Pantoprazole  - Diet: Regular  - Activity: IAT  - IV Fluids: LR  - Code Status: Full code

## 2021-07-19 NOTE — PROGRESS NOTE ADULT - ASSESSMENT
44 yo man with no PMH presenting to the hospital sp syncope /BELEN    ·	BELEN prerenal ? rhabdo mild ? ATN   ·	creat trending down   ·	check ph level   ·	d/c iv fluids if positive po intake   ·	non oliguric   ·	CK improving   ·	sono noted no hydro  ·	no need for RRT  ·	neurology notes appreciated     will follow

## 2021-07-19 NOTE — PROGRESS NOTE ADULT - SUBJECTIVE AND OBJECTIVE BOX
JOCY KENNEDY 45y Male  MRN#: [id number]   CODE STATUS:________    Hospital Day: 2d    Pt is currently admitted with the primary diagnosis of Acute renal failure, Cellulitis of right forearm, rhabdomyolysis    SUBJECTIVE  Pt with no PMH presents 2 days after a fall while at [place]. Patient states there was a black out in the hotel and he tripped over a chair in his room. Pt thinks he blacked out from the fall and woke with pain to right arm, right ribs and right thigh. Over the next 2 days the pain worsened and he developed redness to the arm, thigh and now also to right upper arm. He denies fever but states he's been getting shaking chills. He states he has a red area that had all blisters on his right thigh which was leaking pus and he popped it.  Denies IVDU or substance abuse.chills. Admits to HA and NV today. Denies drug use. Was not intoxicated at time of fall. Length of LOC was unknown. Denies diarrhea/constipation / recent sick contacts / weakness    In the ED  BP  136/91  HR 62  RR 17  T 98.7  SpO2 100% on room air. Labs remarkable for Cr 5.6 CK 6.9K ALT//98. CT head negative for acute bleed. Ptn admitted to medicine service for rhabdomyolysis secondary to presumed diffuse tissue injury s/p traumatic fall under questionable circumstances    Overnight events   None    Subjective complaints  Patient endorsing pain in the right forearm and hand, unable to move wrist and fingers. Denies N/V, SOB, chest pain, palpitations, headaches. States that was using cocaine and xanax two days before ED visit, did not use anything that day.      Present Today:   - Seymour:  No [X  ], Yes [   ] : Indication:     - Type of IV Access:       .. CVC/Piccline:  No [  ], Yes [   ] : Indication:       .. Midline: No [  ], Yes [   ] : Indication:                               OBJECTIVE  PAST MEDICAL & SURGICAL HISTORY                                           ALLERGIES:  No Known Allergies                                        HOME MEDICATIONS  Home Medications:                           MEDICATIONS:  STANDING MEDICATIONS  clindamycin IVPB      clindamycin IVPB 600 milliGRAM(s) IV Intermittent every 8 hours  heparin   Injectable 5000 Unit(s) SubCutaneous every 12 hours    PRN MEDICATIONS  aluminum hydroxide/magnesium hydroxide/simethicone Suspension 30 milliLiter(s) Oral every 4 hours PRN  melatonin 3 milliGRAM(s) Oral at bedtime PRN  ondansetron Injectable 4 milliGRAM(s) IV Push every 8 hours PRN                                            VITAL SIGNS: Last 24 Hours  T(C): 36.3 (19 Jul 2021 05:41), Max: 37.2 (18 Jul 2021 21:44)  T(F): 97.4 (19 Jul 2021 05:41), Max: 98.9 (18 Jul 2021 21:44)  HR: 58 (19 Jul 2021 05:41) (58 - 66)  BP: 144/81 (19 Jul 2021 05:41) (133/90 - 144/81)  BP(mean): --  RR: 18 (19 Jul 2021 05:41) (18 - 18)  SpO2: --      07-18-21 @ 07:01  -  07-19-21 @ 07:00  --------------------------------------------------------  IN: 2950 mL / OUT: 2400 mL / NET: 550 mL                                               LABS:                        11.7   6.64  )-----------( 152      ( 19 Jul 2021 05:31 )             37.4     07-19    138  |  105  |  49<H>  ----------------------------<  103<H>  4.9   |  28  |  3.1<H>    Ca    8.6      19 Jul 2021 05:31  Mg     1.9     07-19    TPro  5.5<L>  /  Alb  3.5  /  TBili  0.3  /  DBili  x   /  AST  32  /  ALT  173<H>  /  AlkPhos  79  07-19    PT/INR - ( 19 Jul 2021 05:31 )   PT: 11.10 sec;   INR: 0.97 ratio         PTT - ( 19 Jul 2021 05:31 )  PTT:32.4 sec      Creatine Kinase, Serum: 1188 U/L *H* (07-19-21 @ 05:31)  Lactate, Blood: 0.6 mmol/L *L* (07-19-21 @ 05:31)        Culture - Blood (collected 16 Jul 2021 22:28)  Source: .Blood Blood  Preliminary Report (18 Jul 2021 09:01):    No growth to date.    Culture - Blood (collected 16 Jul 2021 22:28)  Source: .Blood Blood  Preliminary Report (18 Jul 2021 09:01):    No growth to date.        CARDIAC MARKERS ( 19 Jul 2021 05:31 )  x     / x     / 1188 U/L / x     / x      CARDIAC MARKERS ( 18 Jul 2021 06:49 )  x     / x     / 1658 U/L / x     / x      CARDIAC MARKERS ( 17 Jul 2021 11:50 )  x     / x     / 3634 U/L / x     / x                                                  -------------------------------------------------------------  RADIOLOGY:  < from: US Abdomen Upper Quadrant Right (07.18.21 @ 17:46) >  EXAM:  US ABDOMEN RT UPR QUADRANT            PROCEDURE DATE:  07/18/2021            INTERPRETATION:  CLINICAL INFORMATION: Transaminitis.    COMPARISON: None.    TECHNIQUE: Sonography of the right upper quadrant.    FINDINGS:    Liver: Normal echogenicity. measured length 18 cm.  Bile ducts: Normal caliber. Common bile duct measures 7 mm.  Gallbladder: No pericholecystic fluid or gallbladder wall thickening.  Pancreas: Visualized portions are within normal limits.  Right kidney: 11.7 cm. No hydronephrosis.  Ascites: None.  IVC: Visualized portions are within normal limits.    IMPRESSION:    No sonographic evidence of cholelithiasis or acute cholecystitis.    Borderline-enlarged common bile duct measures approximately 7 mm.    --- End of Report ---    < from: CT Upper Extremity No Cont, Right (07.18.21 @ 08:49) >  EXAM:  CT UPR EXT RT            PROCEDURE DATE:  07/18/2021            INTERPRETATION:  Clinical History / Reason for exam: Upper extremity pain and swelling    Technique:  Multiaxial CT images of the right upper extremity from the shoulder throughthe hand were obtained without intravenous contrast, with coronal and sagittal reformatted images available.    Comparison: None available.    Findings:    There are no acute fractures or dislocations.  Glenohumeral and acromioclavicular joints are preserved.  The partially imaged elbow joint is unremarkable without evidence for fracture or dislocation.    The upper extremity vasculature is poorly evaluated on this noncontrast examination.    There is diffuse subcutaneous swelling involving the soft tissues along the volar aspect of the right distal humerus and forearm. There are no focal drainable fluid collection. There is also right lateral abdomen and right lateral thigh subcutaneous soft tissue swelling. Partially imaged right thigh intermuscular edema.      Impression:    1. No evidence of acute fracture or dislocation.    2. Diffuse subcutaneous swelling involving the soft tissues along the volar aspect of the right distal humerus and forearm; no focal drainable fluid collection.    --- End of Report ---    < from: US Kidney and Bladder (07.17.21 @ 02:42) >  EXAM:  US KIDNEYS AND BLADDER            PROCEDURE DATE:  07/17/2021            INTERPRETATION:  CLINICAL INFORMATION: Renal failure    COMPARISON: None available.    TECHNIQUE: Sonography of the kidneys and bladder.    FINDINGS:    Right kidney: 12.5 cm. No renal hydronephrosis or calculi.    Left kidney:  11.2 cm. No renal  hydronephrosis or calculi.    Urinary bladder: No debris or calculus. Right ureteral jet not visualized. Prevoid volume of approximately 128 cc. The patient was unable to void during the examination.    Prostate volume of approximately 39 cc.    IMPRESSION:    No hydronephrosis.    Prevoid urinary bladder volume approximately 128 cc. Patient unable to void during examination.                                              --------------------------------------------------------------    PHYSICAL EXAM:  GENERAL: NAD, lying in bed comfortably  HEAD:  Atraumatic, Normocephalic  NECK: Supple, No JVD  CHEST/LUNG: Clear to auscultation bilaterally; No rales, rhonchi, wheezing, or rubs. Unlabored respirations  HEART: Regular rate and rhythm; No murmurs, rubs, or gallops  ABDOMEN: BSx4; Soft, nontender, nondistended  EXTREMITIES:  RUE edematous, nonerythematous. LUE nonedematous, no LE edema. No cyanosis  NERVOUS SYSTEM:  A&Ox3, no focal deficits   SKIN: No rashes or lesions

## 2021-07-19 NOTE — PROGRESS NOTE ADULT - SUBJECTIVE AND OBJECTIVE BOX
seen and examined  24 h events noted         PAST HISTORY  --------------------------------------------------------------------------------  No significant changes to PMH, PSH, FHx, SHx, unless otherwise noted    ALLERGIES & MEDICATIONS  --------------------------------------------------------------------------------  Allergies    No Known Allergies    Intolerances      Standing Inpatient Medications  clindamycin IVPB      clindamycin IVPB 600 milliGRAM(s) IV Intermittent every 8 hours  heparin   Injectable 5000 Unit(s) SubCutaneous every 12 hours  sodium chloride 0.9%. 1000 milliLiter(s) IV Continuous <Continuous>    PRN Inpatient Medications  aluminum hydroxide/magnesium hydroxide/simethicone Suspension 30 milliLiter(s) Oral every 4 hours PRN  melatonin 3 milliGRAM(s) Oral at bedtime PRN  ondansetron Injectable 4 milliGRAM(s) IV Push every 8 hours PRN            VITALS/PHYSICAL EXAM  --------------------------------------------------------------------------------  T(C): 36.3 (07-19-21 @ 05:41), Max: 37.2 (07-18-21 @ 21:44)  HR: 58 (07-19-21 @ 05:41) (58 - 66)  BP: 144/81 (07-19-21 @ 05:41) (133/90 - 144/81)  RR: 18 (07-19-21 @ 05:41) (18 - 18)  SpO2: --  Wt(kg): --        07-18-21 @ 07:01  -  07-19-21 @ 07:00  --------------------------------------------------------  IN: 2950 mL / OUT: 2400 mL / NET: 550 mL      Physical Exam:  	Gen: NAD,  	Pulm: CTA B/L  	CV:  S1S2; no rub  	Abd: distended  	    LABS/STUDIES  --------------------------------------------------------------------------------              11.7   6.64  >-----------<  152      [07-19-21 @ 05:31]              37.4     138  |  105  |  49  ----------------------------<  103      [07-19-21 @ 05:31]  4.9   |  28  |  3.1        Ca     8.6     [07-19-21 @ 05:31]      Mg     1.9     [07-19-21 @ 05:31]    TPro  5.5  /  Alb  3.5  /  TBili  0.3  /  DBili  x   /  AST  32  /  ALT  173  /  AlkPhos  79  [07-19-21 @ 05:31]    PT/INR: PT 11.10, INR 0.97       [07-19-21 @ 05:31]  PTT: 32.4       [07-19-21 @ 05:31]    CK 1188      [07-19-21 @ 05:31]    Creatinine Trend:  SCr 3.1 [07-19 @ 05:31]  SCr 3.8 [07-18 @ 06:49]  SCr 4.5 [07-17 @ 11:50]  SCr 5.6 [07-16 @ 22:28]    Urinalysis - [07-16-21 @ 23:56]      Color Colorless / Appearance Clear / SG 1.007 / pH 6.5      Gluc Negative / Ketone Negative  / Bili Negative / Urobili <2 mg/dL       Blood Small / Protein Trace / Leuk Est Negative / Nitrite Negative      RBC 5 / WBC 0 / Hyaline  / Gran  / Sq Epi  / Non Sq Epi 2 / Bacteria Negative

## 2021-07-19 NOTE — PROGRESS NOTE ADULT - ASSESSMENT
ASSESSMENT:  Pt with no PMH who is right handed presents s/p fall with positive LOC now with residual hand pain.     PLAN:  - Arm Elevation with posey block  - Trend CK, K   - F/U MSRA PCR  - Pain Management   - Compartment syndrome ruled out from striker needle measurements: right forearm dorsal 16mmHg, volar 10mmHg      GREEN TEAM SPECTRA: [id number]

## 2021-07-20 VITALS
TEMPERATURE: 97 F | DIASTOLIC BLOOD PRESSURE: 92 MMHG | SYSTOLIC BLOOD PRESSURE: 138 MMHG | HEART RATE: 69 BPM | RESPIRATION RATE: 18 BRPM

## 2021-07-20 LAB
6-ACETYLMORPHINE, UR RESULT: NEGATIVE NG/ML — SIGNIFICANT CHANGE UP
6MAM UR CFM-MCNC: NEGATIVE NG/ML — SIGNIFICANT CHANGE UP
ALBUMIN SERPL ELPH-MCNC: 4 G/DL — SIGNIFICANT CHANGE UP (ref 3.5–5.2)
ALP SERPL-CCNC: 94 U/L — SIGNIFICANT CHANGE UP (ref 30–115)
ALT FLD-CCNC: 161 U/L — HIGH (ref 0–41)
ANION GAP SERPL CALC-SCNC: 8 MMOL/L — SIGNIFICANT CHANGE UP (ref 7–14)
AST SERPL-CCNC: 40 U/L — SIGNIFICANT CHANGE UP (ref 0–41)
BASOPHILS # BLD AUTO: 0.04 K/UL — SIGNIFICANT CHANGE UP (ref 0–0.2)
BASOPHILS NFR BLD AUTO: 0.4 % — SIGNIFICANT CHANGE UP (ref 0–1)
BENZOYLEGONINE, UR RESULT: 1218 NG/ML — SIGNIFICANT CHANGE UP
BILIRUB SERPL-MCNC: 0.4 MG/DL — SIGNIFICANT CHANGE UP (ref 0.2–1.2)
BUN SERPL-MCNC: 33 MG/DL — HIGH (ref 10–20)
BZE UR QL SCN: 1218 NG/ML — SIGNIFICANT CHANGE UP
CALCIUM SERPL-MCNC: 9.1 MG/DL — SIGNIFICANT CHANGE UP (ref 8.5–10.1)
CHLORIDE SERPL-SCNC: 103 MMOL/L — SIGNIFICANT CHANGE UP (ref 98–110)
CO2 SERPL-SCNC: 30 MMOL/L — SIGNIFICANT CHANGE UP (ref 17–32)
COCAINE IN-HOUSE INTERPRETATION: POSITIVE
COCAINE UR QL SCN: POSITIVE
CODEINE UR CFM-MCNC: NEGATIVE NG/ML — SIGNIFICANT CHANGE UP
CODEINE, UR RESULT: NEGATIVE NG/ML — SIGNIFICANT CHANGE UP
CREAT SERPL-MCNC: 2.5 MG/DL — HIGH (ref 0.7–1.5)
EOSINOPHIL # BLD AUTO: 0.31 K/UL — SIGNIFICANT CHANGE UP (ref 0–0.7)
EOSINOPHIL NFR BLD AUTO: 2.7 % — SIGNIFICANT CHANGE UP (ref 0–8)
GLUCOSE SERPL-MCNC: 84 MG/DL — SIGNIFICANT CHANGE UP (ref 70–99)
HCT VFR BLD CALC: 40.2 % — LOW (ref 42–52)
HGB BLD-MCNC: 13 G/DL — LOW (ref 14–18)
HYDROCODONE UR QL CFM: NEGATIVE NG/ML — SIGNIFICANT CHANGE UP
HYDROCODONE, UR RESULT: NEGATIVE NG/ML — SIGNIFICANT CHANGE UP
HYDROMORPHONE UR QL CFM: NEGATIVE NG/ML — SIGNIFICANT CHANGE UP
HYDROMORPHONE, UR RESULT: NEGATIVE NG/ML — SIGNIFICANT CHANGE UP
IMM GRANULOCYTES NFR BLD AUTO: 0.5 % — HIGH (ref 0.1–0.3)
LYMPHOCYTES # BLD AUTO: 1.08 K/UL — LOW (ref 1.2–3.4)
LYMPHOCYTES # BLD AUTO: 9.5 % — LOW (ref 20.5–51.1)
MAGNESIUM SERPL-MCNC: 1.7 MG/DL — LOW (ref 1.8–2.4)
MCHC RBC-ENTMCNC: 31.8 PG — HIGH (ref 27–31)
MCHC RBC-ENTMCNC: 32.3 G/DL — SIGNIFICANT CHANGE UP (ref 32–37)
MCV RBC AUTO: 98.3 FL — HIGH (ref 80–94)
MONOCYTES # BLD AUTO: 0.68 K/UL — HIGH (ref 0.1–0.6)
MONOCYTES NFR BLD AUTO: 6 % — SIGNIFICANT CHANGE UP (ref 1.7–9.3)
NEUTROPHILS # BLD AUTO: 9.16 K/UL — HIGH (ref 1.4–6.5)
NEUTROPHILS NFR BLD AUTO: 80.9 % — HIGH (ref 42.2–75.2)
NRBC # BLD: 0 /100 WBCS — SIGNIFICANT CHANGE UP (ref 0–0)
PLATELET # BLD AUTO: 207 K/UL — SIGNIFICANT CHANGE UP (ref 130–400)
POTASSIUM SERPL-MCNC: 4.5 MMOL/L — SIGNIFICANT CHANGE UP (ref 3.5–5)
POTASSIUM SERPL-SCNC: 4.5 MMOL/L — SIGNIFICANT CHANGE UP (ref 3.5–5)
PROT SERPL-MCNC: 6.3 G/DL — SIGNIFICANT CHANGE UP (ref 6–8)
RBC # BLD: 4.09 M/UL — LOW (ref 4.7–6.1)
RBC # FLD: 11.7 % — SIGNIFICANT CHANGE UP (ref 11.5–14.5)
SODIUM SERPL-SCNC: 141 MMOL/L — SIGNIFICANT CHANGE UP (ref 135–146)
WBC # BLD: 11.33 K/UL — HIGH (ref 4.8–10.8)
WBC # FLD AUTO: 11.33 K/UL — HIGH (ref 4.8–10.8)

## 2021-07-20 PROCEDURE — 99239 HOSP IP/OBS DSCHRG MGMT >30: CPT

## 2021-07-20 RX ORDER — MAGNESIUM OXIDE 400 MG ORAL TABLET 241.3 MG
400 TABLET ORAL ONCE
Refills: 0 | Status: COMPLETED | OUTPATIENT
Start: 2021-07-20 | End: 2021-07-20

## 2021-07-20 RX ORDER — MAGNESIUM SULFATE 500 MG/ML
2 VIAL (ML) INJECTION ONCE
Refills: 0 | Status: DISCONTINUED | OUTPATIENT
Start: 2021-07-20 | End: 2021-07-20

## 2021-07-20 RX ADMIN — MAGNESIUM OXIDE 400 MG ORAL TABLET 400 MILLIGRAM(S): 241.3 TABLET ORAL at 13:39

## 2021-07-20 NOTE — PROGRESS NOTE ADULT - SUBJECTIVE AND OBJECTIVE BOX
INTERVAL HPI/OVERNIGHT EVENTS:    SUBJECTIVE: Patient seen and examined at bedside.     no cp, sob, abd pain, fever  no sob, orthopnea, pnd, cough  OBJECTIVE:    VITAL SIGNS:  Vital Signs Last 24 Hrs  T(C): 36 (20 Jul 2021 05:28), Max: 36.8 (19 Jul 2021 20:48)  T(F): 96.8 (20 Jul 2021 05:28), Max: 98.3 (19 Jul 2021 20:48)  HR: 69 (20 Jul 2021 05:28) (58 - 69)  BP: 138/92 (20 Jul 2021 05:28) (137/86 - 138/92)  BP(mean): --  RR: 18 (20 Jul 2021 05:28) (18 - 18)  SpO2: --      PHYSICAL EXAM:    General: NAD  HEENT: NC/AT; PERRL, clear conjunctiva  Neck: supple  Respiratory: CTA b/l  Cardiovascular: +S1/S2; RRR  Abdomen: soft, NT/ND; +BS x4  Extremities: WWP, 2+ peripheral pulses b/l; R>L 1+ pit edema ue le  Skin: normal color and turgor; no rash  Neurological:    MEDICATIONS:  MEDICATIONS  (STANDING):  heparin   Injectable 5000 Unit(s) SubCutaneous every 12 hours  magnesium sulfate  IVPB 2 Gram(s) IV Intermittent once    MEDICATIONS  (PRN):  aluminum hydroxide/magnesium hydroxide/simethicone Suspension 30 milliLiter(s) Oral every 4 hours PRN Dyspepsia  melatonin 3 milliGRAM(s) Oral at bedtime PRN Insomnia  ondansetron Injectable 4 milliGRAM(s) IV Push every 8 hours PRN Nausea and/or Vomiting      ALLERGIES:  Allergies    No Known Allergies    Intolerances        LABS:                        13.0   11.33 )-----------( 207      ( 20 Jul 2021 08:20 )             40.2     Hemoglobin: 13.0 g/dL (07-20 @ 08:20)  Hemoglobin: 11.7 g/dL (07-19 @ 05:31)  Hemoglobin: 11.6 g/dL (07-18 @ 06:49)  Hemoglobin: 11.8 g/dL (07-17 @ 11:50)  Hemoglobin: 14.8 g/dL (07-16 @ 22:28)    CBC Full  -  ( 20 Jul 2021 08:20 )  WBC Count : 11.33 K/uL  RBC Count : 4.09 M/uL  Hemoglobin : 13.0 g/dL  Hematocrit : 40.2 %  Platelet Count - Automated : 207 K/uL  Mean Cell Volume : 98.3 fL  Mean Cell Hemoglobin : 31.8 pg  Mean Cell Hemoglobin Concentration : 32.3 g/dL  Auto Neutrophil # : 9.16 K/uL  Auto Lymphocyte # : 1.08 K/uL  Auto Monocyte # : 0.68 K/uL  Auto Eosinophil # : 0.31 K/uL  Auto Basophil # : 0.04 K/uL  Auto Neutrophil % : 80.9 %  Auto Lymphocyte % : 9.5 %  Auto Monocyte % : 6.0 %  Auto Eosinophil % : 2.7 %  Auto Basophil % : 0.4 %    07-20    141  |  103  |  33<H>  ----------------------------<  84  4.5   |  30  |  2.5<H>    Ca    9.1      20 Jul 2021 08:20  Mg     1.7     07-20    TPro  6.3  /  Alb  4.0  /  TBili  0.4  /  DBili  x   /  AST  40  /  ALT  161<H>  /  AlkPhos  94  07-20    Creatinine Trend: 2.5<--, 3.1<--, 3.8<--, 4.5<--, 5.6<--  LIVER FUNCTIONS - ( 20 Jul 2021 08:20 )  Alb: 4.0 g/dL / Pro: 6.3 g/dL / ALK PHOS: 94 U/L / ALT: 161 U/L / AST: 40 U/L / GGT: x           PT/INR - ( 19 Jul 2021 05:31 )   PT: 11.10 sec;   INR: 0.97 ratio         PTT - ( 19 Jul 2021 05:31 )  PTT:32.4 sec    hs Troponin:              CSF:                      EKG:   MICROBIOLOGY:    IMAGING:      Labs, imaging, EKG personally reviewed    RADIOLOGY & ADDITIONAL TESTS: Reviewed.

## 2021-07-20 NOTE — PROGRESS NOTE ADULT - SUBJECTIVE AND OBJECTIVE BOX
Nephrology progress note    Patient is seen and examined, events over the last 24 h noted .    Allergies:  No Known Allergies    Hospital Medications:   MEDICATIONS  (STANDING):  heparin   Injectable 5000 Unit(s) SubCutaneous every 12 hours        VITALS:  T(F): 96.8 (21 @ 05:28), Max: 98.3 (21 @ 20:48)  HR: 69 (21 @ 05:28)  BP: 138/92 (21 @ 05:28)  RR: 18 (21 @ 05:28)  SpO2: --  Wt(kg): --     @ 07:01  -   @ 07:00  --------------------------------------------------------  IN: 2950 mL / OUT: 2400 mL / NET: 550 mL          PHYSICAL EXAM:  Constitutional: NAD  HEENT: anicteric sclera, oropharynx clear, MMM  Neck: No JVD  Respiratory: CTAB, no wheezes, rales or rhonchi  Cardiovascular: S1, S2, RRR  Gastrointestinal: BS+, soft, NT/ND  Extremities: Rt thigh abrasion Trace peripheral edema  Neurological: A/O x 3, no focal deficits  : No CVA tenderness. No murguia.   Skin: No rashes  Vascular Access:    LABS:      141  |  103  |  33<H>  ----------------------------<  84  4.5   |  30  |  2.5<H>  Creatinine Trend: 2.5<--, 3.1<--, 3.8<--, 4.5<--, 5.6<--    Ca    9.1      2021 08:20  Mg     1.7         TPro  6.3  /  Alb  4.0  /  TBili  0.4  /  DBili      /  AST  40  /  ALT  161<H>  /  AlkPhos  94                            13.0   11.33 )-----------( 207      ( 2021 08:20 )             40.2       Urine Studies:  Urinalysis Basic - ( 2021 23:56 )    Color: Colorless / Appearance: Clear / S.007 / pH:   Gluc:  / Ketone: Negative  / Bili: Negative / Urobili: <2 mg/dL   Blood:  / Protein: Trace / Nitrite: Negative   Leuk Esterase: Negative / RBC: 5 /HPF / WBC 0 /HPF   Sq Epi:  / Non Sq Epi: 2 /HPF / Bacteria: Negative        RADIOLOGY & ADDITIONAL STUDIES:

## 2021-07-20 NOTE — CDI QUERY NOTE - NSCDINOTEPOA_GEN_ALL_CORE_FT
Was the condition present on admission? If so, please document in the chart that “(the condition) was present on admission.”
Home

## 2021-07-20 NOTE — CDI QUERY NOTE - NSCDIOTHERTXTBX_GEN_ALL_CORE_HH
Dr. Magaña,    46 y/o male admitted to the hospital s/p syncope, BELEN and traumatic Rhabdo  CK 6923  Creatinine 7/16/21 = 5.6  Creatinine 7/17/21 = 4.5  Creatinine 7/18/21 = 3.8  Creatinine 7/19/21 = 3.1  Creatinine 7/20/21 = 2.5    Treatment IVF  Nephrology consult indicates:  BELEN prerenal? Rhabdo mild, ? ATN    Please clarify the diagnosis of ATN:    -ATN ruled in after study  -ATN ruled out after study  -Other (Please specify)  -Clinically unable to determine  Thank you  Piedad Murphy

## 2021-07-20 NOTE — DISCHARGE NOTE PROVIDER - NSDCCPCAREPLAN_GEN_ALL_CORE_FT
PRINCIPAL DISCHARGE DIAGNOSIS  Diagnosis: Acute renal failure  Assessment and Plan of Treatment: You were noted to have a temporary insult to your kidney function either at the time that you arrived at the hospital or during your stay here. We have monitored your kidney function with blood work during your time here and you are at a level that no longer requires continued hospital level care. We do recommend that you follow up to continually have your kidney function checked. You can follow up with your primary care doctor, or, if recommended in the discharge paperwork, you should follow up with a kidney specialist called a nephrologist.        SECONDARY DISCHARGE DIAGNOSES  Diagnosis: Cellulitis of forearm, right  Assessment and Plan of Treatment: Take the prescribed antibiotic medicine you are given as directed until it is gone. Take it even if you feel better. It treats the infection and stops it from  Not taking all the medicine can make future infections hard to treat. Keep the infected area clean. When possible, raise the infected area above the level of your heart. This helps keep swelling down. Apply clean bandages as advised. Wash your hands often to prevent spreading the infection. In the future, wash your hands before and after you touch cuts, scratches, or bandages. This will help prevent infection.   Call your doctor or returnt o the emergency room or call 911 immediately if you have any of the following: Difficulty or pain when moving the joints above or below the infected area, Discharge or pus draining from the area, rever of 100.4°F (38°C) or higher, or as directed by your healthcare provider, pain that gets worse in or around the infected site, redness that gets worse in or around the infected area, particularly if the area of redness expands to a wider area, shaking chills, swelling of the infected area, vomiting.      Diagnosis: Rhabdomyolysis  Assessment and Plan of Treatment: Rhamdomyolysis is the breakdown of skeletal muscle fibers with leakage of muscle contents into the circulation. The most common causes are crush injury, overexertion, alcohol abuse and certain medicines and toxic substances.   Fall prevention includes ways to make your home and other areas safer. It also includes ways you can move more carefully to prevent a fall. Health conditions that cause changes in your blood pressure, vision, or muscle strength and coordination may increase your risk for falls. Medicines may also increase your risk for falls if they make you dizzy, weak, or sleepy.  SEEK IMMEDIATE MEDICAL ATTENTION IF: You have fallen and are unconscious, you have fallen and cannot move part of your body, or you have fallen and have pain or a headache.

## 2021-07-20 NOTE — DISCHARGE NOTE NURSING/CASE MANAGEMENT/SOCIAL WORK - PATIENT PORTAL LINK FT
You can access the FollowMyHealth Patient Portal offered by [facility] by registering at the following website: [email or web address]. By joining [facility]’s FollowMyHealth portal, you will also be able to view your health information using other applications (apps) compatible with our system.

## 2021-07-20 NOTE — DISCHARGE NOTE PROVIDER - HOSPITAL COURSE
Pt with no PMH presented 2 days after a fall while at [employer]. Patient states there was a black out in the hotel and he tripped over a chair in his room. Pt thinks he blacked out from the fall and woke with pain to right arm, right ribs and right thigh. Over the next 2 days the pain worsened and he developed redness to the arm, thigh and now also to right upper arm. He denies fever but states he's been getting shaking chills. He states he has a red area that had all blisters on his right thigh which was leaking pus and he popped it.  Denies IVDU or substance abuse.chills. Admits to HA and NV today. Denies drug use. Was not intoxicated at time of fall. Length of LOC was unknown. Denies diarrhea/constipation / recent sick contacts / weakness    In the ED  BP  136/91  HR 62  RR 17  T 98.7  SpO2 100% on room air. Labs remarkable for Cr 5.6 CK 6.9K ALT//98. CT head negative for acute bleed. Ptn admitted to medicine service for rhabdomyolysis secondary to presumed diffuse tissue injury s/p traumatic fall under questionable circumstances Pt with no PMH presented 2 days after a fall while at [place]. Patient states there was a black out in the hotel and he tripped over a chair in his room. Pt thinks he blacked out from the fall and woke with pain to right arm, right ribs and right thigh. Over the next 2 days the pain worsened and he developed redness to the arm, thigh and now also to right upper arm. He denies fever but states he's been getting shaking chills. He states he has a red area that had all blisters on his right thigh which was leaking pus and he popped it.  Denies IVDU or substance abuse.  Admits to HA and NV today. Denies drug use. Was not intoxicated at time of fall. Length of LOC was unknown. Denies diarrhea/constipation / recent sick contacts / weakness    In the ED  BP  136/91  HR 62  RR 17  T 98.7  SpO2 100% on room air. Labs remarkable for Cr 5.6 CK 6.9K ALT//98. CT head negative for acute bleed. Ptn admitted to medicine service for rhabdomyolysis secondary to presumed diffuse tissue injury s/p traumatic fall under questionable circumstances. Urine Tox came back positive for benzodiazepines, cocaine and opiates (patient received morphine in ED).     Patient received IVF (LR) for BELEN and Clindamycin for cellulitis of right thigh. Neurology evaluation including video EEG done, stated possible seizure due to benzo withdrawal. Plastic surgery consulted for RUE edema and possible compartment syndrome, evaluation was negative. RUE imaging all negative.     Patient can follow up Hepatitis panel and BMP outpatient    Pt with no PMH presented 2 days after a fall while at [place]. Patient states there was a black out in the hotel and he tripped over a chair in his room. Pt thinks he blacked out from the fall and woke with pain to right arm, right ribs and right thigh. Over the next 2 days the pain worsened and he developed redness to the arm, thigh and now also to right upper arm. He denies fever but states he's been getting shaking chills. He states he has a red area that had all blisters on his right thigh which was leaking pus and he popped it.  Denies IVDU or substance abuse.  Admits to HA and NV today. Denies drug use. Was not intoxicated at time of fall. Length of LOC was unknown. Denies diarrhea/constipation / recent sick contacts / weakness    In the ED  BP  136/91  HR 62  RR 17  T 98.7  SpO2 100% on room air. Labs remarkable for Cr 5.6 CK 6.9K ALT//98. CT head negative for acute bleed. Ptn admitted to medicine service for rhabdomyolysis secondary to presumed diffuse tissue injury s/p traumatic fall under questionable circumstances. Urine Tox came back positive for benzodiazepines, cocaine and opiates (patient received morphine in ED).     Patient received IVF (LR) for BELEN and Clindamycin for cellulitis of right thigh. Neurology evaluation including video EEG done, stated possible seizure due to benzo withdrawal. Plastic surgery consulted for RUE edema and possible compartment syndrome, evaluation was negative. RUE imaging all negative.     Patient can follow up Hepatitis panel and BMP outpatient     41 minutes spent on discharge planning.

## 2021-07-20 NOTE — PROGRESS NOTE ADULT - ASSESSMENT
46 yo man with no PMH presenting to the hospital sp syncope /BELEN    ·	BELEN prerenal ? rhabdo mild ? ATN   ·	creat trending down   ·	check ph level   ·	d/c iv fluids if positive po intake   ·	non oliguric   ·	CK improving   ·	sono noted no hydro    will follow

## 2021-07-20 NOTE — DISCHARGE NOTE PROVIDER - NSDCFUADDINST_GEN_ALL_CORE_FT
Dr. Ponce is an Addiction Medicine Specialist    Dr. Fontenot is a primary care physician. Please follow up with him for test results

## 2021-07-20 NOTE — DISCHARGE NOTE PROVIDER - PROVIDER TOKENS
PROVIDER:[TOKEN:[[id number]],FOLLOWUP:[1 week]],PROVIDER:[TOKEN:[[id number]],FOLLOWUP:[2 weeks]]

## 2021-07-20 NOTE — DISCHARGE NOTE PROVIDER - CARE PROVIDER_API CALL
Jeramy Fontenot (DO)  Medicine  Physicians  [address], [address]st [place] [address]  Phone: [phone]  Fax: [phone]  Follow Up Time: 1 week    Eli Ponce)  Internal Medicine  [address] [place] [address]  Phone: [phone]  Fax: [phone]  Follow Up Time: 2 weeks

## 2021-07-20 NOTE — PROGRESS NOTE ADULT - PROVIDER SPECIALTY LIST ADULT
Internal Medicine
Nephrology
Internal Medicine
Nephrology
Internal Medicine
Neurology
Plastic Surgery

## 2021-07-20 NOTE — PROGRESS NOTE ADULT - ASSESSMENT
45M PMHx cocaine abuse, benzo abuse here with syncope, found to have rhabdo.    #Rhabdomyolysis  resolving, c/b rachel  scr improving s/p ivf  ua noted  stable for d/c, needs outpt pmd, neuro, addicition med f/u one week, repeat bmp at that time  #Transaminitis, suspected due to rhabdo  ruq us neg  f/u acute hepatitis  resolving  #Syncope, in setting of benzo, opioid, cocaine abuse  suspected intoxication/ overdose  pt unable to recount history  eeg neg  cth neg  no further w/u per neuro  #R hand weakness  difficulty with flexion/ extension  in setting of RUE swelling, no compartment syndrome per sx  may be brachial nerve palsy  outpt neuro f/u for emg if no improvement  #Cocaine/ benzo abuse  addiction med as above  no evidence of withdrawal  #DVT ppx  subq hep

## 2021-07-21 LAB
HAV IGM SER-ACNC: SIGNIFICANT CHANGE UP
HBV CORE IGM SER-ACNC: SIGNIFICANT CHANGE UP
HBV SURFACE AG SER-ACNC: SIGNIFICANT CHANGE UP
HCV AB S/CO SERPL IA: 0.07 S/CO — SIGNIFICANT CHANGE UP (ref 0–0.99)
HCV AB SERPL-IMP: SIGNIFICANT CHANGE UP
MORPHINE UR QL CFM: 164 NG/ML — SIGNIFICANT CHANGE UP
MORPHINE, UR RESULT: 164 NG/ML — SIGNIFICANT CHANGE UP
NOROXYCODONE (OPIATES), UR RESULT: 2636 NG/ML — SIGNIFICANT CHANGE UP
NOROXYCODONE UR CFM-MCNC: 2636 NG/ML — SIGNIFICANT CHANGE UP
OPIATES IN-HOUSE INTERPRETATION: POSITIVE
OPIATES UR QL CFM: POSITIVE
OXYCODONE (OPIATES), UR RESULT: 1512 NG/ML — SIGNIFICANT CHANGE UP
OXYCODONE UR-MCNC: 1512 NG/ML — SIGNIFICANT CHANGE UP
OXYMORPHONE (OPIATES), UR RESULT: 285 NG/ML — SIGNIFICANT CHANGE UP
OXYMORPHONE UR CFM-MCNC: 285 NG/ML — SIGNIFICANT CHANGE UP

## 2021-07-22 LAB
CULTURE RESULTS: SIGNIFICANT CHANGE UP
CULTURE RESULTS: SIGNIFICANT CHANGE UP
SPECIMEN SOURCE: SIGNIFICANT CHANGE UP
SPECIMEN SOURCE: SIGNIFICANT CHANGE UP

## 2021-07-27 DIAGNOSIS — Z92.22 PERSONAL HISTORY OF MONOCLONAL DRUG THERAPY: ICD-10-CM

## 2021-07-27 DIAGNOSIS — F13.10 SEDATIVE, HYPNOTIC OR ANXIOLYTIC ABUSE, UNCOMPLICATED: ICD-10-CM

## 2021-07-27 DIAGNOSIS — E87.5 HYPERKALEMIA: ICD-10-CM
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
## 2021-07-27 DIAGNOSIS — R74.01 ELEVATION OF LEVELS OF LIVER TRANSAMINASE LEVELS: ICD-10-CM

## 2021-07-27 DIAGNOSIS — F11.10 OPIOID ABUSE, UNCOMPLICATED: ICD-10-CM

## 2021-07-27 DIAGNOSIS — T79.6XXA TRAUMATIC ISCHEMIA OF MUSCLE, INITIAL ENCOUNTER: ICD-10-CM

## 2021-07-27 DIAGNOSIS — G40.89 OTHER SEIZURES: ICD-10-CM
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
## 2021-07-27 DIAGNOSIS — N17.0 ACUTE KIDNEY FAILURE WITH TUBULAR NECROSIS: ICD-10-CM
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
## 2021-07-27 DIAGNOSIS — F14.10 COCAINE ABUSE, UNCOMPLICATED: ICD-10-CM

## 2021-07-27 DIAGNOSIS — L03.115 CELLULITIS OF RIGHT LOWER LIMB: ICD-10-CM

## 2021-07-27 DIAGNOSIS — F17.200 NICOTINE DEPENDENCE, UNSPECIFIED, UNCOMPLICATED: ICD-10-CM

## 2022-08-30 NOTE — DISCHARGE NOTE NURSING/CASE MANAGEMENT/SOCIAL WORK - NSPROEXTENSIONSOFSELF_GEN_A_NUR
Spoke to pharmacist at [facility] who reports patient picked up #7 of Trintellix 10mg.  They need new Rx for 20mg tablets.  Writer escribed Rx for 20mg, 1 tablet daily to be started when 10mg pills are gone.   none

## 2023-04-02 ENCOUNTER — EMERGENCY (EMERGENCY)
Facility: HOSPITAL | Age: 48
LOS: 0 days | Discharge: HOME | End: 2023-04-02
Attending: EMERGENCY MEDICINE
Payer: COMMERCIAL

## 2023-04-02 VITALS
OXYGEN SATURATION: 98 % | SYSTOLIC BLOOD PRESSURE: 146 MMHG | DIASTOLIC BLOOD PRESSURE: 68 MMHG | RESPIRATION RATE: 18 BRPM | HEART RATE: 71 BPM | TEMPERATURE: 97 F | WEIGHT: 220.02 LBS

## 2023-04-02 DIAGNOSIS — Z23 ENCOUNTER FOR IMMUNIZATION: ICD-10-CM

## 2023-04-02 DIAGNOSIS — S01.112A LACERATION WITHOUT FOREIGN BODY OF LEFT EYELID AND PERIOCULAR AREA, INITIAL ENCOUNTER: ICD-10-CM

## 2023-04-02 DIAGNOSIS — R10.9 UNSPECIFIED ABDOMINAL PAIN: ICD-10-CM

## 2023-04-02 DIAGNOSIS — F10.929 ALCOHOL USE, UNSPECIFIED WITH INTOXICATION, UNSPECIFIED: ICD-10-CM
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
## 2023-04-02 DIAGNOSIS — Z20.822 CONTACT WITH AND (SUSPECTED) EXPOSURE TO COVID-19: ICD-10-CM
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
## 2023-04-02 LAB
ALBUMIN SERPL ELPH-MCNC: 4.8 G/DL — SIGNIFICANT CHANGE UP (ref 3.5–5.2)
ALP SERPL-CCNC: 109 U/L — SIGNIFICANT CHANGE UP (ref 30–115)
ALT FLD-CCNC: 18 U/L — SIGNIFICANT CHANGE UP (ref 0–41)
ANION GAP SERPL CALC-SCNC: 12 MMOL/L — SIGNIFICANT CHANGE UP (ref 7–14)
APTT BLD: 39.6 SEC — HIGH (ref 27–39.2)
AST SERPL-CCNC: 17 U/L — SIGNIFICANT CHANGE UP (ref 0–41)
BASOPHILS # BLD AUTO: 0.04 K/UL — SIGNIFICANT CHANGE UP (ref 0–0.2)
BASOPHILS NFR BLD AUTO: 0.6 % — SIGNIFICANT CHANGE UP (ref 0–1)
BILIRUB SERPL-MCNC: <0.2 MG/DL — SIGNIFICANT CHANGE UP (ref 0.2–1.2)
BUN SERPL-MCNC: 14 MG/DL — SIGNIFICANT CHANGE UP (ref 10–20)
CALCIUM SERPL-MCNC: 9.2 MG/DL — SIGNIFICANT CHANGE UP (ref 8.4–10.5)
CHLORIDE SERPL-SCNC: 105 MMOL/L — SIGNIFICANT CHANGE UP (ref 98–110)
CO2 SERPL-SCNC: 24 MMOL/L — SIGNIFICANT CHANGE UP (ref 17–32)
CREAT SERPL-MCNC: 1 MG/DL — SIGNIFICANT CHANGE UP (ref 0.7–1.5)
EGFR: 94 ML/MIN/1.73M2 — SIGNIFICANT CHANGE UP
EOSINOPHIL # BLD AUTO: 0.31 K/UL — SIGNIFICANT CHANGE UP (ref 0–0.7)
EOSINOPHIL NFR BLD AUTO: 4.4 % — SIGNIFICANT CHANGE UP (ref 0–8)
ETHANOL SERPL-MCNC: 253 MG/DL — HIGH
GLUCOSE SERPL-MCNC: 109 MG/DL — HIGH (ref 70–99)
HCT VFR BLD CALC: 47.5 % — SIGNIFICANT CHANGE UP (ref 42–52)
HGB BLD-MCNC: 15.9 G/DL — SIGNIFICANT CHANGE UP (ref 14–18)
IMM GRANULOCYTES NFR BLD AUTO: 0.4 % — HIGH (ref 0.1–0.3)
INR BLD: 0.96 RATIO — SIGNIFICANT CHANGE UP (ref 0.65–1.3)
LACTATE SERPL-SCNC: 2 MMOL/L — SIGNIFICANT CHANGE UP (ref 0.7–2)
LIDOCAIN IGE QN: 85 U/L — HIGH (ref 7–60)
LYMPHOCYTES # BLD AUTO: 2.05 K/UL — SIGNIFICANT CHANGE UP (ref 1.2–3.4)
LYMPHOCYTES # BLD AUTO: 29 % — SIGNIFICANT CHANGE UP (ref 20.5–51.1)
MCHC RBC-ENTMCNC: 32.4 PG — HIGH (ref 27–31)
MCHC RBC-ENTMCNC: 33.5 G/DL — SIGNIFICANT CHANGE UP (ref 32–37)
MCV RBC AUTO: 96.9 FL — HIGH (ref 80–94)
MONOCYTES # BLD AUTO: 0.58 K/UL — SIGNIFICANT CHANGE UP (ref 0.1–0.6)
MONOCYTES NFR BLD AUTO: 8.2 % — SIGNIFICANT CHANGE UP (ref 1.7–9.3)
NEUTROPHILS # BLD AUTO: 4.05 K/UL — SIGNIFICANT CHANGE UP (ref 1.4–6.5)
NEUTROPHILS NFR BLD AUTO: 57.4 % — SIGNIFICANT CHANGE UP (ref 42.2–75.2)
NRBC # BLD: 0 /100 WBCS — SIGNIFICANT CHANGE UP (ref 0–0)
PLATELET # BLD AUTO: 255 K/UL — SIGNIFICANT CHANGE UP (ref 130–400)
POTASSIUM SERPL-MCNC: 4.2 MMOL/L — SIGNIFICANT CHANGE UP (ref 3.5–5)
POTASSIUM SERPL-SCNC: 4.2 MMOL/L — SIGNIFICANT CHANGE UP (ref 3.5–5)
PROT SERPL-MCNC: 7.4 G/DL — SIGNIFICANT CHANGE UP (ref 6–8)
PROTHROM AB SERPL-ACNC: 10.9 SEC — SIGNIFICANT CHANGE UP (ref 9.95–12.87)
RBC # BLD: 4.9 M/UL — SIGNIFICANT CHANGE UP (ref 4.7–6.1)
RBC # FLD: 12.4 % — SIGNIFICANT CHANGE UP (ref 11.5–14.5)
SARS-COV-2 RNA SPEC QL NAA+PROBE: SIGNIFICANT CHANGE UP
SODIUM SERPL-SCNC: 141 MMOL/L — SIGNIFICANT CHANGE UP (ref 135–146)
TROPONIN T SERPL-MCNC: <0.01 NG/ML — SIGNIFICANT CHANGE UP
WBC # BLD: 7.06 K/UL — SIGNIFICANT CHANGE UP (ref 4.8–10.8)
WBC # FLD AUTO: 7.06 K/UL — SIGNIFICANT CHANGE UP (ref 4.8–10.8)

## 2023-04-02 PROCEDURE — 71260 CT THORAX DX C+: CPT | Mod: 26,MA

## 2023-04-02 PROCEDURE — 70450 CT HEAD/BRAIN W/O DYE: CPT | Mod: 26,MA

## 2023-04-02 PROCEDURE — 80307 DRUG TEST PRSMV CHEM ANLYZR: CPT

## 2023-04-02 PROCEDURE — 87635 SARS-COV-2 COVID-19 AMP PRB: CPT

## 2023-04-02 PROCEDURE — 85025 COMPLETE CBC W/AUTO DIFF WBC: CPT

## 2023-04-02 PROCEDURE — 12011 RPR F/E/E/N/L/M 2.5 CM/<: CPT

## 2023-04-02 PROCEDURE — 90715 TDAP VACCINE 7 YRS/> IM: CPT

## 2023-04-02 PROCEDURE — 71260 CT THORAX DX C+: CPT | Mod: MA

## 2023-04-02 PROCEDURE — 99291 CRITICAL CARE FIRST HOUR: CPT | Mod: 25

## 2023-04-02 PROCEDURE — 84484 ASSAY OF TROPONIN QUANT: CPT

## 2023-04-02 PROCEDURE — 93010 ELECTROCARDIOGRAM REPORT: CPT

## 2023-04-02 PROCEDURE — 72170 X-RAY EXAM OF PELVIS: CPT

## 2023-04-02 PROCEDURE — 72125 CT NECK SPINE W/O DYE: CPT | Mod: 26,MA

## 2023-04-02 PROCEDURE — 90471 IMMUNIZATION ADMIN: CPT

## 2023-04-02 PROCEDURE — 83605 ASSAY OF LACTIC ACID: CPT

## 2023-04-02 PROCEDURE — G0390: CPT

## 2023-04-02 PROCEDURE — 85610 PROTHROMBIN TIME: CPT

## 2023-04-02 PROCEDURE — 74177 CT ABD & PELVIS W/CONTRAST: CPT | Mod: MA

## 2023-04-02 PROCEDURE — 36415 COLL VENOUS BLD VENIPUNCTURE: CPT

## 2023-04-02 PROCEDURE — 74177 CT ABD & PELVIS W/CONTRAST: CPT | Mod: 26,MA

## 2023-04-02 PROCEDURE — 71045 X-RAY EXAM CHEST 1 VIEW: CPT

## 2023-04-02 PROCEDURE — 83690 ASSAY OF LIPASE: CPT

## 2023-04-02 PROCEDURE — 72125 CT NECK SPINE W/O DYE: CPT | Mod: MA

## 2023-04-02 PROCEDURE — 70450 CT HEAD/BRAIN W/O DYE: CPT | Mod: MA

## 2023-04-02 PROCEDURE — 72170 X-RAY EXAM OF PELVIS: CPT | Mod: 26

## 2023-04-02 PROCEDURE — 93005 ELECTROCARDIOGRAM TRACING: CPT

## 2023-04-02 PROCEDURE — 99285 EMERGENCY DEPT VISIT HI MDM: CPT | Mod: 25

## 2023-04-02 PROCEDURE — 85730 THROMBOPLASTIN TIME PARTIAL: CPT

## 2023-04-02 PROCEDURE — 99283 EMERGENCY DEPT VISIT LOW MDM: CPT | Mod: 24,25

## 2023-04-02 PROCEDURE — 71045 X-RAY EXAM CHEST 1 VIEW: CPT | Mod: 26

## 2023-04-02 PROCEDURE — 82962 GLUCOSE BLOOD TEST: CPT

## 2023-04-02 PROCEDURE — 80053 COMPREHEN METABOLIC PANEL: CPT

## 2023-04-02 RX ORDER — SODIUM CHLORIDE 9 MG/ML
250 INJECTION INTRAMUSCULAR; INTRAVENOUS; SUBCUTANEOUS ONCE
Refills: 0 | Status: DISCONTINUED | OUTPATIENT
Start: 2023-04-02 | End: 2023-04-02

## 2023-04-02 RX ORDER — TETANUS TOXOID, REDUCED DIPHTHERIA TOXOID AND ACELLULAR PERTUSSIS VACCINE, ADSORBED 5; 2.5; 8; 8; 2.5 [IU]/.5ML; [IU]/.5ML; UG/.5ML; UG/.5ML; UG/.5ML
0.5 SUSPENSION INTRAMUSCULAR ONCE
Refills: 0 | Status: COMPLETED | OUTPATIENT
Start: 2023-04-02 | End: 2023-04-02

## 2023-04-02 RX ADMIN — TETANUS TOXOID, REDUCED DIPHTHERIA TOXOID AND ACELLULAR PERTUSSIS VACCINE, ADSORBED 0.5 MILLILITER(S): 5; 2.5; 8; 8; 2.5 SUSPENSION INTRAMUSCULAR at 22:01

## 2023-04-02 NOTE — CONSULT NOTE ADULT - ASSESSMENT
ASSESSMENT:  47y Male  w/o PMHx seen as Trauma Alert  s/p unwitnessed fall, +HT, ?LOC, -AC, +etoh with external signs of trauma include 2cm left supraorbital laceration . Trauma assessment in ED: ABCs intact , GCS 14 , JAIME.     Injuries identified:   - 2cm left supraorbital laceration     PLAN:   - Trauma Labs: (CBC, BMP, Coags, T&S, UA, EtOH level)  Additional studies:  EKG  Utox    Trauma Imaging to include the following:  - CXR, Pelvic Xray  - CT Head,  CT C-spine, CT Max/Face, CT Chest, CT Abd/Pelvis  - Extremity films: None        Disposition pending results of above labs and imaging  Above plan discussed with Trauma attending, Dr. Lynch  , patient, patient family, and ED team  --------------------------------------------------------------------------------------  04-02-23 @ 19:56   ASSESSMENT:  47y Male  w/o PMHx seen as Trauma Alert  s/p unwitnessed fall, +HT, ?LOC, -AC, +etoh with external signs of trauma include 2cm left supraorbital laceration . Trauma assessment in ED: ABCs intact , GCS 14 (intoxicated), JAIME.     Injuries identified:   - 2cm left supraorbital laceration     PLAN:   - Trauma Labs: (CBC, BMP, Coags, T&S, UA, EtOH level)  Additional studies:  EKG  Utox    Trauma Imaging to include the following:  - CXR, Pelvic Xray  - CT Head,  CT C-spine, CT Chest, CT Abd/Pelvis  - Extremity films: None    Patient without evidence of acute traumatic injury. Patient with findings of questionable ischemic changes, not trauma related. Patient can be dispo as per ED, and if admitted to hospital for any reason trauma team will perform tertiary exam in 24 hrs.    Above plan discussed with Trauma attending, Dr. Lynch  , patient, patient family, and ED team  --------------------------------------------------------------------------------------  04-02-23 @ 19:56

## 2023-04-02 NOTE — ED PROVIDER NOTE - CLINICAL SUMMARY MEDICAL DECISION MAKING FREE TEXT BOX
Patient presented status post unwitnessed fall while intoxicated.  States that he tripped and fell down a flight of stairs with head trauma although is unsure if he had LOC.  Complaining of diffuse body pain as well as laceration to the forehead.  Trauma alert called on arrival given mechanism and trauma team at bedside.  Obtained labs which were grossly unremarkable including no significant leukocytosis, anemia, signs of dehydration/BELEN, transaminitis or significant electrolyte abnormalities.  Alcohol level 253.  Pan scan for trauma obtained and negative for acute traumatic injury.  Patient subsequently cleared from trauma standpoint.  Patient monitored in ED until sobriety at which time patient ambulatory without difficulty with steady gait, tolerating p.o., AAOx3, no SI/HI/hallucinations, no evidence of EtOH withdrawal.  No further complaints.  Laceration repaired without complication.  Given the above, will discharge home with outpatient follow up. Patient agreeable with plan. Agrees to return to ED for any new or worsening symptoms.

## 2023-04-02 NOTE — CONSULT NOTE ADULT - SUBJECTIVE AND OBJECTIVE BOX
TRAUMA ACTIVATION LEVEL:  ALERT   ACTIVATED BY: EMS**  INTUBATED: NO**      MECHANISM OF INJURY:   [x] Fall	    GCS: 14 	E: 4	V: 4	M: 6    HPI:    47yM w/o PMHx seen as Trauma Alert s/p unwitnessed fall, +HT, ?LOC, -AC, +etoh. Patient is a poor historian due to intoxication. As per family at bedside and EMS, patient had +etoh use earlier today, fell down unknown amount of stairs around 6:30PM, +HT. Family heard fall and called EMS immediately. At bedside examination there is a 2cm left supraorbital laceration, no other traumatic injuries identified. Patient denies tenderness in head, neck, chest, abdomen, pelvis, back, or extremities.  Trauma assessment in ED: ABCs intact , GCS 14    PAST MEDICAL & SURGICAL HISTORY:      Allergies    No Known Allergies    Intolerances        Home Medications:      ROS: 10-system review is otherwise negative except HPI above.      Primary Survey:    A - airway intact  B - bilateral breath sounds and good chest rise  C - palpable pulses in all extremities  D - GCS 14 on arrival, JAIME  Exposure obtained    Vital Signs Last 24 Hrs  T(C): --  T(F): --  HR: --  BP: --  BP(mean): --  RR: --  SpO2: --        Secondary Survey:   General: NAD  HEENT: Normocephalic, EOMI, PEERLA. +2cm laceration R supraorbital   Neck: Soft, midline trachea. no c-spine tenderness, C-collar in place  Chest: No chest wall tenderness, no subcutaneous emphysema   Cardiac: S1, S2, RRR  Respiratory: Bilateral breath sounds, clear and equal bilaterally  Abdomen: Soft, non-distended, non-tender, no rebound, no guarding.  Groin: Normal appearing, pelvis stable   Ext:  Moving b/l upper and lower extremities. Palpable Radial b/l UE, b/l DP palpable in LE.   Back: No T/L/S spine tenderness, No palpable runoff/stepoff/deformity        Labs:  CAPILLARY BLOOD GLUCOSE      POCT Blood Glucose.: 109 mg/dL (02 Apr 2023 19:28)                          15.9   7.06  )-----------( 255      ( 02 Apr 2023 19:34 )             47.5       Auto Neutrophil %: 57.4 % (04-02-23 @ 19:34)  Auto Immature Granulocyte %: 0.4 % (04-02-23 @ 19:34)    04-02    141  |  105  |  14  ----------------------------<  109<H>  4.2   |  24  |  1.0      Calcium, Total Serum: 9.2 mg/dL (04-02-23 @ 19:34)      LFTs:             7.4  | <0.2 | 17       ------------------[109     ( 02 Apr 2023 19:34 )  4.8  | x    | 18          Lipase:85     Amylase:x         Lactate, Blood: 2.0 mmol/L (04-02-23 @ 19:34)      Coags:     10.90  ----< 0.96    ( 02 Apr 2023 19:34 )     39.6        CARDIAC MARKERS ( 02 Apr 2023 19:34 )  x     / <0.01 ng/mL / x     / x     / x            Alcohol, Blood: 253 mg/dL (04-02-23 @ 19:34)            Alcohol, Blood: 253 mg/dL (04-02-23 @ 19:34)      RADIOLOGY & ADDITIONAL STUDIES:  ---------------------------------------------------------------------------------------     TRAUMA ACTIVATION LEVEL:  ALERT   ACTIVATED BY: EMS**  INTUBATED: NO**      MECHANISM OF INJURY:   [x] Fall	    GCS: 14 	E: 4	V: 4	M: 6    HPI:    47yM w/o PMHx seen as Trauma Alert s/p unwitnessed fall, +HT, ?LOC, -AC, +etoh. Patient is a poor historian due to intoxication. As per family at bedside and EMS, patient had +etoh use earlier today, fell down unknown amount of stairs around 6:30PM, +HT. Family heard fall and called EMS immediately. At bedside examination there is a 2cm left supraorbital laceration, no other traumatic injuries identified. Patient denies tenderness in head, neck, chest, abdomen, pelvis, back, or extremities.  Trauma assessment in ED: ABCs intact , GCS 14    PAST MEDICAL & SURGICAL HISTORY:      Allergies    No Known Allergies    Intolerances        Home Medications:      ROS: 10-system review is otherwise negative except HPI above.      Primary Survey:    A - airway intact  B - bilateral breath sounds and good chest rise  C - palpable pulses in all extremities  D - GCS 14 on arrival, JAIME  Exposure obtained      Secondary Survey:   General: NAD  HEENT: Normocephalic, EOMI, PEERLA. +2cm laceration L supraorbital   Neck: Soft, midline trachea. no c-spine tenderness, C-collar in place  Chest: No chest wall tenderness, no subcutaneous emphysema   Cardiac: S1, S2, RRR  Respiratory: Bilateral breath sounds, clear and equal bilaterally  Abdomen: Soft, non-distended, non-tender, no rebound, no guarding.  Groin: Normal appearing, pelvis stable   Ext:  Moving b/l upper and lower extremities. Palpable Radial b/l UE, b/l DP palpable in LE.   Back: No T/L/S spine tenderness, No palpable runoff/stepoff/deformity        Labs:  CAPILLARY BLOOD GLUCOSE      POCT Blood Glucose.: 109 mg/dL (02 Apr 2023 19:28)                          15.9   7.06  )-----------( 255      ( 02 Apr 2023 19:34 )             47.5       Auto Neutrophil %: 57.4 % (04-02-23 @ 19:34)  Auto Immature Granulocyte %: 0.4 % (04-02-23 @ 19:34)    04-02    141  |  105  |  14  ----------------------------<  109<H>  4.2   |  24  |  1.0      Calcium, Total Serum: 9.2 mg/dL (04-02-23 @ 19:34)      LFTs:             7.4  | <0.2 | 17       ------------------[109     ( 02 Apr 2023 19:34 )  4.8  | x    | 18          Lipase:85     Amylase:x         Lactate, Blood: 2.0 mmol/L (04-02-23 @ 19:34)      Coags:     10.90  ----< 0.96    ( 02 Apr 2023 19:34 )     39.6        CARDIAC MARKERS ( 02 Apr 2023 19:34 )  x     / <0.01 ng/mL / x     / x     / x            Alcohol, Blood: 253 mg/dL (04-02-23 @ 19:34)        RADIOLOGY & ADDITIONAL STUDIES:  < from: CT Head No Cont (04.02.23 @ 21:01) >  IMPRESSION:    Head:  Left frontal extracalvarial soft tissue swelling and laceration.  No CT evidence for acute intracranial hemorrhage or calvarial fracture.  New periventricular white matter hypoattenuation without mass effect.   Findings may reflect interval development of small vessel ischemic change   versus possibility of gliosis or other white matter/demyelinating   disease. Consider MRI for further evaluation.    Cervical Spine:  No CT evidence for acute cervical spine fracture or subluxation.    --- End of Report ---    < end of copied text >  < from: CT Abdomen and Pelvis w/ IV Cont (04.02.23 @ 20:30) >  IMPRESSION:    No CT evidence of acute traumatic injury to the chest, abdomen or pelvis.    --- End of Report ---    < end of copied text >    ---------------------------------------------------------------------------------------

## 2023-04-02 NOTE — CONSULT NOTE ADULT - ATTENDING COMMENTS
Trauma Attending H&P Attestation    Patient seen and evaluated with the trauma team in the trauma bay upon arrival. All pertinent labs and radiographic imaging reviewed, pending final reports. Outpatient medications reviewed, including the presence of anticoagulants, if applicable. I agree with the resident's note above, including the physical exam findings, assessment and plan as documented with the following adjustments.     Trauma Level: [ ] Code  [ x] Alert  [ ] Consult [ ] Transfer in  Patient is seen at the bedside at 21:45  Activation by:  [x ] ED physician [x ] EMS  Intubated in Field? [ ] Yes [x ] No  Intubated in ED? [ ] Yes [x ] No  Intubated in Trauma [place]? [ ] Yes [x ] No    JOCY KENNEDY Patient is a 47y old  Male who presents with a chief complaint of  fall down the stairs, small eyebrow laceration noted without active bleeding  Patient presented with GCS [14 ]  upon arrival to the trauma bay due to intixication.  Allergies  No Known Allergies  Intolerances    PAST MEDICAL & SURGICAL HISTORY:    On AC/Antiplatelets [ ] Yes [ x] No              [ ] NOVACs, [ ] Coumadin, [ ] ASA, [ ] Antiplatelets     Vital Signs Last 24 Hrs  T(C): 36.1 (02 Apr 2023 20:43), Max: 36.1 (02 Apr 2023 20:43)  T(F): 97 (02 Apr 2023 20:43), Max: 97 (02 Apr 2023 20:43)  HR: 71 (02 Apr 2023 20:43) (71 - 71)  BP: 146/68 (02 Apr 2023 20:43) (146/68 - 146/68)  BP(mean): --  RR: 18 (02 Apr 2023 20:43) (18 - 18)  SpO2: 98% (02 Apr 2023 20:43) (98% - 98%)    Parameters below as of 02 Apr 2023 20:43  Patient On (Oxygen Delivery Method): room air    PE: eyebrow laceration  Assessment: fall down the stairs  PLAN  - supportive care  - GI/DVT prophylaxis  - pain management  - repeat studies as needed  - complete and follow up on trauma work up included but not limites to                          [x ] CXR [x ] PXR [x ] Extremities X-RAYs                          [ x] NCHCT [x ] C-Spine CT [x ] CT Chest [x ] CT Abdomen/Pelvis                          [x ] FAST [ ] Other                          [ x] Trauma Labs   [ ] Toxicology   - Follow up Consults  [ ] Neurosurgery [ ] Orthopaedics [ ] Plastics [ ] Fascial/OMFS [ ] Opthalmology   [ ] Urology  [ ] ENT                                          [ ] Medicine [ ] Geriatrics [ ] Cardiology/EP [ ] Hospice/Palliative Care                                        [ ] Pediatric ICU  [ ] SICU/SDU [ ] Burn/Burn ICU  - IV ABx give as indicated [x ] Yes [ ] No  - Tetanus given as indicated [ x] Yes [ ] No  - Seizures prophylaxis  [ ] Yes,  [x ] No    Vashti Lynch MD, FACS  Trauma/ACS/Surgical Critical Care Attending

## 2023-04-02 NOTE — ED PROVIDER NOTE - OBJECTIVE STATEMENT
47 y m, no pmh, pw after fall. Pt had four martinis then tripped and fell down one flight of stairs, +head trauma, ?loc, endorses mild abd pain, +lac to eyebow, denies pain in chest, back and extremities. Fall happened 20 minutes ago, unwitnessed but pt aaox3.

## 2023-04-02 NOTE — ED PROVIDER NOTE - PATIENT PORTAL LINK FT
You can access the FollowMyHealth Patient Portal offered by [facility] by registering at the following website: [email or web address]. By joining [employer]’s FollowMyHealth portal, you will also be able to view your health information using other applications (apps) compatible with our system.

## 2023-04-02 NOTE — ED ADULT NURSE NOTE - NSIMPLEMENTINTERV_GEN_ALL_ED
Negative Implemented All Universal Safety Interventions:  [place] to call system. Call bell, personal items and telephone within reach. Instruct patient to call for assistance. Room bathroom lighting operational. Non-slip footwear when patient is off stretcher. Physically safe environment: no spills, clutter or unnecessary equipment. Stretcher in lowest position, wheels locked, appropriate side rails in place.

## 2023-04-02 NOTE — ED PROVIDER NOTE - PHYSICAL EXAMINATION
CONSTITUTIONAL: NAD  SKIN: Warm dry  HEAD: +2cm lac on forehead  EYES: NL inspection  ENT: MMM  NECK: Supple; non tender.  CARD: RRR  RESP: CTAB  ABD: ttp epigastric  EXT: no pedal edema  NEURO: Grossly unremarkable  PSYCH: Cooperative, appropriate.

## 2023-04-02 NOTE — ED PROVIDER NOTE - NSFOLLOWUPINSTRUCTIONS_ED_ALL_ED_FT
Fall Prevention in the Home, Adult  Falls can cause injuries. They can happen to people of all ages. There are many things you can do to make your home safe and to help prevent falls. Ask for help when making these changes, if needed.    What actions can I take to prevent falls?  General Instructions     Use good lighting in all rooms. Replace any light bulbs that burn out.  Turn on the lights when you go into a dark area. Use night-lights.  Keep items that you use often in easy-to-reach places. Lower the shelves around your home if necessary.  Set up your furniture so you have a clear path. Avoid moving your furniture around.  Do not have throw rugs and other things on the floor that can make you trip.  Avoid walking on wet floors.  If any of your floors are uneven, fix them.  Add color or contrast paint or tape to clearly onel and help you see:  Any grab bars or handrails.  First and last steps of stairways.  Where the edge of each step is.  If you use a stepladder:  Make sure that it is fully opened. Do not climb a closed stepladder.  Make sure that both sides of the stepladder are locked into place.  Ask someone to hold the stepladder for you while you use it.  If there are any pets around you, be aware of where they are.  What can I do in the bathroom?     Keep the floor dry. Clean up any water that spills onto the floor as soon as it happens.  Remove soap buildup in the tub or shower regularly.  Use non-skid mats or decals on the floor of the tub or shower.  Attach bath mats securely with double-sided, non-slip rug tape.  If you need to sit down in the shower, use a plastic, non-slip stool.  Install grab bars by the toilet and in the tub and shower. Do not use towel bars as grab bars.  What can I do in the bedroom?     Make sure that you have a light by your bed that is easy to reach.  Do not use any sheets or blankets that are too big for your bed. They should not hang down onto the floor.  Have a firm chair that has side arms. You can use this for support while you get dressed.  What can I do in the kitchen?     Clean up any spills right away.  If you need to reach something above you, use a strong step stool that has a grab bar.  Keep electrical cords out of the way.  Do not use floor polish or wax that makes floors slippery. If you must use wax, use non-skid floor wax.  What can I do with my stairs?     Do not leave any items on the stairs.  Make sure that you have a light switch at the top of the stairs and the bottom of the stairs. If you do not have them, ask someone to add them for you.  Make sure that there are handrails on both sides of the stairs, and use them. Fix handrails that are broken or loose. Make sure that handrails are as long as the stairways.  Install non-slip stair treads on all stairs in your home.  Avoid having throw rugs at the top or bottom of the stairs. If you do have throw rugs, attach them to the floor with carpet tape.  Choose a carpet that does not hide the edge of the steps on the stairway.  Check any carpeting to make sure that it is firmly attached to the stairs. Fix any carpet that is loose or worn.  What can I do on the outside of my home?     Use bright outdoor lighting.  Regularly fix the edges of walkways and driveways and fix any cracks.  Remove anything that might make you trip as you walk through a door, such as a raised step or threshold.  Trim any bushes or trees on the path to your home.  Regularly check to see if handrails are loose or broken. Make sure that both sides of any steps have handrails.  Install guardrails along the edges of any raised decks and porches.  Clear walking paths of anything that might make someone trip, such as tools or rocks.  Have any leaves, snow, or ice cleared regularly.  Use sand or salt on walking paths during winter.  Clean up any spills in your garage right away. This includes grease or oil spills.  What other actions can I take?     Wear shoes that:  Have a low heel. Do not wear high heels.  Have rubber bottoms.  Are comfortable and fit you well.  Are closed at the toe. Do not wear open-toe sandals.  Use tools that help you move around (mobility aids) if they are needed. These include:  Canes.  Walkers.  Scooters.  Crutches.  Review your medicines with your doctor. Some medicines can make you feel dizzy. This can increase your chance of falling.  Ask your doctor what other things you can do to help prevent falls.    Where to find more information  Centers for Disease Control and PreventionHARINI: https://cdc.gov  [employer]: https://tw9xmpb.levi.nih.gov  Contact a doctor if:  You are afraid of falling at home.  You feel weak, drowsy, or dizzy at home.  You fall at home.  Summary  There are many simple things that you can do to make your home safe and to help prevent falls.  Ways to make your home safe include removing tripping hazards and installing grab bars in the bathroom.  Ask for help when making these changes in your home.  This information is not intended to replace advice given to you by your health care provider. Make sure you discuss any questions you have with your health care provider.      Laceration    A laceration is a cut that goes through all of the layers of the skin and into the tissue that is right under the skin. Some lacerations heal on their own. Others need to be closed with stitches (sutures), staples, skin adhesive strips, or skin glue. Proper laceration care minimizes the risk of infection and helps the laceration to heal better.     SEEK IMMEDIATE MEDICAL CARE IF YOU HAVE THE FOLLOWING SYMPTOMS: swelling around the wound, worsening pain, drainage from the wound, red streaking going away from your wound, inability to move finger or toe near the laceration, or discoloration of skin near the laceration.